# Patient Record
Sex: FEMALE | Race: WHITE | NOT HISPANIC OR LATINO
[De-identification: names, ages, dates, MRNs, and addresses within clinical notes are randomized per-mention and may not be internally consistent; named-entity substitution may affect disease eponyms.]

---

## 2021-11-03 PROBLEM — Z00.00 ENCOUNTER FOR PREVENTIVE HEALTH EXAMINATION: Status: ACTIVE | Noted: 2021-11-03

## 2021-12-26 ENCOUNTER — NON-APPOINTMENT (OUTPATIENT)
Age: 26
End: 2021-12-26

## 2021-12-28 ENCOUNTER — APPOINTMENT (OUTPATIENT)
Dept: OBGYN | Facility: CLINIC | Age: 26
End: 2021-12-28
Payer: COMMERCIAL

## 2021-12-28 ENCOUNTER — TRANSCRIPTION ENCOUNTER (OUTPATIENT)
Age: 26
End: 2021-12-28

## 2021-12-28 VITALS
HEIGHT: 63 IN | BODY MASS INDEX: 22.15 KG/M2 | WEIGHT: 125 LBS | HEART RATE: 75 BPM | RESPIRATION RATE: 14 BRPM | DIASTOLIC BLOOD PRESSURE: 60 MMHG | SYSTOLIC BLOOD PRESSURE: 112 MMHG

## 2021-12-28 DIAGNOSIS — Z01.419 ENCOUNTER FOR GYNECOLOGICAL EXAMINATION (GENERAL) (ROUTINE) W/OUT ABNORMAL FINDINGS: ICD-10-CM

## 2021-12-28 DIAGNOSIS — Z80.3 FAMILY HISTORY OF MALIGNANT NEOPLASM OF BREAST: ICD-10-CM

## 2021-12-28 PROCEDURE — 99395 PREV VISIT EST AGE 18-39: CPT

## 2021-12-28 NOTE — HISTORY OF PRESENT ILLNESS
[Regular Cycle Intervals] : periods have been regular [Menarche Age: ____] : age at menarche was [unfilled] [Currently Active] : currently active [Men] : men [Vaginal] : vaginal [No] : No [Condoms] : Condoms [TextBox_4] : This is the patient's first ever GYN visit. Pt not on any medications.\par , no plans for pregnancy yet\par LMP 21\par periods are regular, consistent, cycles always 28-30 days, duration 6 days\par gets premenstrual headaches but no cramps, no heavy bleeds, uses tampons & pads both\par Not on BC, always uses condoms and natural planning apps for tracking fertile days\par sexually active with one male partner only, for her entire life, who is her now \par Received gardasil [PapSmeardate] : never [FreeTextEntry1] : 12/21/21

## 2021-12-29 ENCOUNTER — APPOINTMENT (OUTPATIENT)
Dept: OBGYN | Facility: CLINIC | Age: 26
End: 2021-12-29

## 2022-08-22 ENCOUNTER — NON-APPOINTMENT (OUTPATIENT)
Age: 27
End: 2022-08-22

## 2022-08-30 ENCOUNTER — APPOINTMENT (OUTPATIENT)
Dept: OBGYN | Facility: CLINIC | Age: 27
End: 2022-08-30
Payer: COMMERCIAL

## 2022-08-30 VITALS
DIASTOLIC BLOOD PRESSURE: 60 MMHG | HEIGHT: 63 IN | BODY MASS INDEX: 22.15 KG/M2 | WEIGHT: 125 LBS | SYSTOLIC BLOOD PRESSURE: 120 MMHG

## 2022-08-30 PROCEDURE — 99213 OFFICE O/P EST LOW 20 MIN: CPT | Mod: 25

## 2022-08-30 PROCEDURE — 76817 TRANSVAGINAL US OBSTETRIC: CPT

## 2022-08-30 PROCEDURE — 99203 OFFICE O/P NEW LOW 30 MIN: CPT | Mod: 25

## 2022-08-30 NOTE — HISTORY OF PRESENT ILLNESS
[FreeTextEntry1] : 28yo  presents with positive pregnancy  test.\par LMP: 7/15/2022\par SCAR 2023\par GA today: 6w4d\par \par Office US: CRL 0.89cm measuring 6w6d, averaged over 3 measurements\par \par Plan:\par New OB visit in 3 weeks\par Reviewed PNV, diet, exercise, pregnancy journey, genetic testing.\par Patient and her  already completed universal screening.\par SALLY Bernstein MD\par

## 2022-09-21 ENCOUNTER — APPOINTMENT (OUTPATIENT)
Dept: OBGYN | Facility: CLINIC | Age: 27
End: 2022-09-21

## 2022-09-21 VITALS
DIASTOLIC BLOOD PRESSURE: 78 MMHG | BODY MASS INDEX: 25.34 KG/M2 | SYSTOLIC BLOOD PRESSURE: 118 MMHG | HEIGHT: 63 IN | WEIGHT: 143 LBS

## 2022-09-21 DIAGNOSIS — Z34.91 ENCOUNTER FOR SUPERVISION OF NORMAL PREGNANCY, UNSPECIFIED, FIRST TRIMESTER: ICD-10-CM

## 2022-09-21 PROCEDURE — 76817 TRANSVAGINAL US OBSTETRIC: CPT

## 2022-09-21 PROCEDURE — 36415 COLL VENOUS BLD VENIPUNCTURE: CPT

## 2022-09-21 PROCEDURE — 81015 MICROSCOPIC EXAM OF URINE: CPT

## 2022-09-21 PROCEDURE — 0501F PRENATAL FLOW SHEET: CPT

## 2022-09-22 LAB
25(OH)D3 SERPL-MCNC: 25.9 NG/ML
ABO + RH PNL BLD: NORMAL
BASOPHILS # BLD AUTO: 0.05 K/UL
BASOPHILS NFR BLD AUTO: 0.5 %
BLD GP AB SCN SERPL QL: NORMAL
EOSINOPHIL # BLD AUTO: 0.17 K/UL
EOSINOPHIL NFR BLD AUTO: 1.7 %
ESTIMATED AVERAGE GLUCOSE: 100 MG/DL
GLUCOSE 1H P 50 G GLC PO SERPL-MCNC: 124 MG/DL
HBA1C MFR BLD HPLC: 5.1 %
HCT VFR BLD CALC: 39.6 %
HGB A MFR BLD: 96.6 %
HGB A2 MFR BLD: 2.8 %
HGB BLD-MCNC: 12.8 G/DL
HGB F MFR BLD: 0.6 %
HGB FRACT BLD-IMP: NORMAL
HIV1+2 AB SPEC QL IA.RAPID: NONREACTIVE
IMM GRANULOCYTES NFR BLD AUTO: 0.4 %
LYMPHOCYTES # BLD AUTO: 1.23 K/UL
LYMPHOCYTES NFR BLD AUTO: 12.1 %
MAN DIFF?: NORMAL
MCHC RBC-ENTMCNC: 28.9 PG
MCHC RBC-ENTMCNC: 32.3 GM/DL
MCV RBC AUTO: 89.4 FL
MONOCYTES # BLD AUTO: 0.53 K/UL
MONOCYTES NFR BLD AUTO: 5.2 %
NEUTROPHILS # BLD AUTO: 8.15 K/UL
NEUTROPHILS NFR BLD AUTO: 80.1 %
PLATELET # BLD AUTO: 260 K/UL
RBC # BLD: 4.43 M/UL
RBC # FLD: 12.1 %
TSH SERPL-ACNC: 1.68 UIU/ML
WBC # FLD AUTO: 10.17 K/UL

## 2022-09-26 ENCOUNTER — NON-APPOINTMENT (OUTPATIENT)
Age: 27
End: 2022-09-26

## 2022-09-26 DIAGNOSIS — Z13.79 ENCOUNTER FOR OTHER SCREENING FOR GENETIC AND CHROMOSOMAL ANOMALIES: ICD-10-CM

## 2022-09-28 ENCOUNTER — APPOINTMENT (OUTPATIENT)
Dept: ANTEPARTUM | Facility: CLINIC | Age: 27
End: 2022-09-28

## 2022-10-06 LAB
B19V IGG SER QL IA: 5.83 INDEX
B19V IGG+IGM SER-IMP: NORMAL
B19V IGG+IGM SER-IMP: POSITIVE
B19V IGM FLD-ACNC: 0.09 INDEX
B19V IGM SER-ACNC: NEGATIVE
BACTERIA UR CULT: NORMAL
C TRACH RRNA SPEC QL NAA+PROBE: NOT DETECTED
HBV SURFACE AG SER QL: NONREACTIVE
HCV AB SER QL: NONREACTIVE
HCV S/CO RATIO: 0.08 S/CO
HSV 1+2 IGG SER IA-IMP: NEGATIVE
HSV 1+2 IGG SER IA-IMP: NEGATIVE
HSV1 IGG SER QL: 0.14 INDEX
HSV1 IGM SER QL: NEGATIVE
HSV2 AB FLD-ACNC: NEGATIVE
HSV2 IGG SER QL: 0.05 INDEX
MEV IGG FLD QL IA: >300 AU/ML
MEV IGG+IGM SER-IMP: POSITIVE
N GONORRHOEA RRNA SPEC QL NAA+PROBE: NOT DETECTED
RUBV IGG FLD-ACNC: 9.4 INDEX
RUBV IGG SER-IMP: POSITIVE
SOURCE AMPLIFICATION: NORMAL
T PALLIDUM AB SER QL IA: NEGATIVE
VZV AB TITR SER: POSITIVE
VZV IGG SER IF-ACNC: 174.5 INDEX

## 2022-10-10 ENCOUNTER — APPOINTMENT (OUTPATIENT)
Dept: ANTEPARTUM | Facility: CLINIC | Age: 27
End: 2022-10-10

## 2022-10-10 ENCOUNTER — ASOB RESULT (OUTPATIENT)
Age: 27
End: 2022-10-10

## 2022-10-10 PROCEDURE — 76813 OB US NUCHAL MEAS 1 GEST: CPT

## 2022-10-10 PROCEDURE — 36415 COLL VENOUS BLD VENIPUNCTURE: CPT

## 2022-10-13 LAB
ADDITIONAL US: NORMAL
CRL SCAN TWIN B: NORMAL
CRL SCAN: NORMAL
CROWN RUMP LENGTH TWIN B: NORMAL
CROWN RUMP LENGTH: 62.7 MM
DIA MOM: 0.78
DIA VALUE: 185.5 PG/ML
DOWN SYNDROME AGE RISK: NORMAL
DOWN SYNDROME INTERPRETATION: NORMAL
DOWN SYNDROME SCREENING RISK: NORMAL
FIRST TRIMESTER SCREEN COMMENTS: NORMAL
FIRST TRIMESTER SCREEN NOTE: NORMAL
FIRST TRIMESTER SCREEN RESULTS: NORMAL
FIRST TRIMESTER SCREEN TEST RESULTS: NORMAL
GEST. AGE ON COLLECTION DATE: 12.4 WEEKS
HCG MOM: 0.68
HCG VALUE: 69.4 IU/ML
MATERNAL AGE AT EDD: 29.8 YR
NT MOM TWIN B: NORMAL
NT TWIN B: NORMAL
NUCHAL TRANSLUCENCY (NT): 1.8 MM
NUCHAL TRANSLUCENCY MOM: 1.35
NUMBER OF FETUSES: 1
RACE: NORMAL
SONOGRAPHER ID#: NORMAL
TRISOMY 18 AGE RISK: NORMAL
TRISOMY 18 INTERPRETATION: NORMAL
TRISOMY 18 SCREENING RISK: NORMAL
WEIGHT AFP: 143 LBS

## 2022-10-20 ENCOUNTER — NON-APPOINTMENT (OUTPATIENT)
Age: 27
End: 2022-10-20

## 2022-10-20 ENCOUNTER — APPOINTMENT (OUTPATIENT)
Dept: OBGYN | Facility: CLINIC | Age: 27
End: 2022-10-20

## 2022-10-20 PROCEDURE — 81003 URINALYSIS AUTO W/O SCOPE: CPT | Mod: QW

## 2022-10-20 PROCEDURE — 36415 COLL VENOUS BLD VENIPUNCTURE: CPT

## 2022-10-20 PROCEDURE — 0502F SUBSEQUENT PRENATAL CARE: CPT

## 2022-10-24 LAB
BILIRUB UR QL STRIP: NORMAL
GLUCOSE UR-MCNC: NORMAL
HCG UR QL: 0.2 EU/DL
HGB UR QL STRIP.AUTO: NORMAL
KETONES UR-MCNC: NORMAL
LEUKOCYTE ESTERASE UR QL STRIP: NORMAL
NITRITE UR QL STRIP: NORMAL
PH UR STRIP: 5.5
PROT UR STRIP-MCNC: NORMAL
SP GR UR STRIP: 1

## 2022-11-01 ENCOUNTER — NON-APPOINTMENT (OUTPATIENT)
Age: 27
End: 2022-11-01

## 2022-11-03 LAB
CLARI ADDITIONAL INFO: NORMAL
CLARI CHROMOSOME 13: NORMAL
CLARI CHROMOSOME 18: NORMAL
CLARI CHROMOSOME 21: NORMAL
CLARI SEX CHROMOSOMES: NORMAL
CLARI TEST COMMENT: NORMAL
CLARITEST NIPT: NORMAL
FETAL FRACT: NORMAL
GESTATION AGE: NORMAL
MATERNAL WEIGHT (LBS):: NORMAL
PLEASE INCLUDE GENDER RESULTS ON THIS REPORT:: NORMAL
TYPE OF PREGNANCY:: NORMAL

## 2022-11-08 ENCOUNTER — NON-APPOINTMENT (OUTPATIENT)
Age: 27
End: 2022-11-08

## 2022-11-14 ENCOUNTER — APPOINTMENT (OUTPATIENT)
Dept: ANTEPARTUM | Facility: CLINIC | Age: 27
End: 2022-11-14

## 2022-11-14 ENCOUNTER — NON-APPOINTMENT (OUTPATIENT)
Age: 27
End: 2022-11-14

## 2022-11-14 PROCEDURE — 36415 COLL VENOUS BLD VENIPUNCTURE: CPT

## 2022-11-15 ENCOUNTER — APPOINTMENT (OUTPATIENT)
Dept: OBGYN | Facility: CLINIC | Age: 27
End: 2022-11-15

## 2022-11-15 VITALS
SYSTOLIC BLOOD PRESSURE: 120 MMHG | HEART RATE: 100 BPM | WEIGHT: 146 LBS | TEMPERATURE: 98 F | BODY MASS INDEX: 25.86 KG/M2 | DIASTOLIC BLOOD PRESSURE: 68 MMHG

## 2022-11-15 LAB
BILIRUB UR QL STRIP: NORMAL
GLUCOSE UR-MCNC: NORMAL
HCG UR QL: 0.2 EU/DL
HGB UR QL STRIP.AUTO: NORMAL
KETONES UR-MCNC: NORMAL
LEUKOCYTE ESTERASE UR QL STRIP: NORMAL
NITRITE UR QL STRIP: NORMAL
PH UR STRIP: 6
PROT UR STRIP-MCNC: NORMAL
SP GR UR STRIP: 1.01

## 2022-11-15 PROCEDURE — 0502F SUBSEQUENT PRENATAL CARE: CPT

## 2022-11-17 LAB
ADDITIONAL US: NORMAL
AFP MOM: 0.61
AFP VALUE: 24.5 NG/ML
COLLECTED ON 2: NORMAL
COLLECTED ON: NORMAL
CRL SCAN TWIN B: NORMAL
CRL SCAN: NORMAL
CROWN RUMP LENGTH TWIN B: NORMAL
CROWN RUMP LENGTH: 62.7 MM
DIA MOM: 0.78
DIA VALUE: 122.1 PG/ML
DOWN SYNDROME AGE RISK: NORMAL
DOWN SYNDROME INTERPRETATION: NORMAL
DOWN SYNDROME SCREENING RISK: NORMAL
FIRST TRIMESTER SAMPLE: NORMAL
GEST. AGE ON COLLECTION DATE: 12.4 WEEKS
GESTATIONAL AGE: 17.4 WEEKS
HCG MOM: 0.89
HCG VALUE: 26.6 IU/ML
INSULIN DEP DIABETES: NO
MATERNAL AGE AT EDD: 29.8 YR
NT MOM TWIN B: NORMAL
NT TWIN B: NORMAL
NUCHAL TRANSLUCENCY (NT): 1.8 MM
NUCHAL TRANSLUCENCY MOM: 1.35
NUMBER OF FETUSES: 1
OPEN SPINA BIFIDA: NORMAL
OSB INTERPRETATION: NORMAL
PAPP-A MOM: 1.83
PAPP-A VALUE: 1818.3 NG/ML
RACE: NORMAL
SECOND TRIMESTER SAMPLE: NORMAL
SEQUENTIAL 2 COMMENTS: NORMAL
SEQUENTIAL 2 NOTE: NORMAL
SEQUENTIAL 2 RESULTS: NORMAL
SEQUENTIAL 2 TEST RESULTS: NORMAL
SONOGRAPHER ID#: NORMAL
TRISOMY 18 AGE RISK: NORMAL
TRISOMY 18 INTERPRETATION: NORMAL
TRISOMY 18 SCREENING RISK: NORMAL
UE3 MOM: 1.01
UE3 VALUE: 1.31 NG/ML
WEIGHT AFP: 143 LBS
WEIGHT: 147 LBS

## 2022-12-06 ENCOUNTER — ASOB RESULT (OUTPATIENT)
Age: 27
End: 2022-12-06

## 2022-12-06 ENCOUNTER — APPOINTMENT (OUTPATIENT)
Dept: ANTEPARTUM | Facility: CLINIC | Age: 27
End: 2022-12-06

## 2022-12-06 PROCEDURE — 76805 OB US >/= 14 WKS SNGL FETUS: CPT

## 2022-12-06 PROCEDURE — 76817 TRANSVAGINAL US OBSTETRIC: CPT | Mod: 59

## 2022-12-14 ENCOUNTER — NON-APPOINTMENT (OUTPATIENT)
Age: 27
End: 2022-12-14

## 2022-12-15 ENCOUNTER — APPOINTMENT (OUTPATIENT)
Dept: OBGYN | Facility: CLINIC | Age: 27
End: 2022-12-15

## 2022-12-15 VITALS
BODY MASS INDEX: 26.22 KG/M2 | WEIGHT: 148 LBS | SYSTOLIC BLOOD PRESSURE: 114 MMHG | HEIGHT: 63 IN | DIASTOLIC BLOOD PRESSURE: 70 MMHG | RESPIRATION RATE: 16 BRPM

## 2022-12-15 PROCEDURE — 81003 URINALYSIS AUTO W/O SCOPE: CPT | Mod: QW

## 2022-12-15 PROCEDURE — 0502F SUBSEQUENT PRENATAL CARE: CPT

## 2022-12-30 ENCOUNTER — APPOINTMENT (OUTPATIENT)
Dept: OBGYN | Facility: CLINIC | Age: 27
End: 2022-12-30

## 2023-01-09 DIAGNOSIS — Z34.92 ENCOUNTER FOR SUPERVISION OF NORMAL PREGNANCY, UNSPECIFIED, SECOND TRIMESTER: ICD-10-CM

## 2023-01-10 ENCOUNTER — OUTPATIENT (OUTPATIENT)
Dept: INPATIENT UNIT | Facility: HOSPITAL | Age: 28
LOS: 1 days | Discharge: HOME | End: 2023-01-10
Payer: COMMERCIAL

## 2023-01-10 ENCOUNTER — APPOINTMENT (OUTPATIENT)
Dept: OBGYN | Facility: CLINIC | Age: 28
End: 2023-01-10
Payer: COMMERCIAL

## 2023-01-10 VITALS
HEART RATE: 153 BPM | TEMPERATURE: 97.1 F | RESPIRATION RATE: 14 BRPM | DIASTOLIC BLOOD PRESSURE: 90 MMHG | BODY MASS INDEX: 27.11 KG/M2 | OXYGEN SATURATION: 98 % | WEIGHT: 153 LBS | SYSTOLIC BLOOD PRESSURE: 135 MMHG | HEIGHT: 63 IN

## 2023-01-10 DIAGNOSIS — Z3A.00 WEEKS OF GESTATION OF PREGNANCY NOT SPECIFIED: ICD-10-CM

## 2023-01-10 DIAGNOSIS — O26.899 OTHER SPECIFIED PREGNANCY RELATED CONDITIONS, UNSPECIFIED TRIMESTER: ICD-10-CM

## 2023-01-10 LAB
ALBUMIN SERPL ELPH-MCNC: 2.9 G/DL — LOW (ref 3.3–5)
ALP SERPL-CCNC: 59 U/L — SIGNIFICANT CHANGE UP (ref 40–120)
ALT FLD-CCNC: 44 U/L — SIGNIFICANT CHANGE UP (ref 12–78)
ANION GAP SERPL CALC-SCNC: 6 MMOL/L — SIGNIFICANT CHANGE UP (ref 5–17)
APPEARANCE UR: CLEAR — SIGNIFICANT CHANGE UP
APTT BLD: 27.8 SEC — SIGNIFICANT CHANGE UP (ref 27.5–35.5)
AST SERPL-CCNC: 29 U/L — SIGNIFICANT CHANGE UP (ref 15–37)
BASOPHILS # BLD AUTO: 0 K/UL — SIGNIFICANT CHANGE UP (ref 0–0.2)
BASOPHILS NFR BLD AUTO: 0 % — SIGNIFICANT CHANGE UP (ref 0–2)
BILIRUB SERPL-MCNC: 0.3 MG/DL — SIGNIFICANT CHANGE UP (ref 0.2–1.2)
BILIRUB UR-MCNC: NEGATIVE — SIGNIFICANT CHANGE UP
BUN SERPL-MCNC: 7 MG/DL — SIGNIFICANT CHANGE UP (ref 7–23)
CALCIUM SERPL-MCNC: 8.9 MG/DL — SIGNIFICANT CHANGE UP (ref 8.5–10.1)
CHLORIDE SERPL-SCNC: 109 MMOL/L — HIGH (ref 96–108)
CO2 SERPL-SCNC: 24 MMOL/L — SIGNIFICANT CHANGE UP (ref 22–31)
COLOR SPEC: YELLOW — SIGNIFICANT CHANGE UP
CREAT ?TM UR-MCNC: 27 MG/DL — SIGNIFICANT CHANGE UP
CREAT SERPL-MCNC: 0.41 MG/DL — LOW (ref 0.5–1.3)
DIFF PNL FLD: NEGATIVE — SIGNIFICANT CHANGE UP
EGFR: 136 ML/MIN/1.73M2 — SIGNIFICANT CHANGE UP
EOSINOPHIL # BLD AUTO: 0.24 K/UL — SIGNIFICANT CHANGE UP (ref 0–0.5)
EOSINOPHIL NFR BLD AUTO: 2 % — SIGNIFICANT CHANGE UP (ref 0–6)
FIBRINOGEN PPP-MCNC: 656 MG/DL — HIGH (ref 330–520)
GLUCOSE SERPL-MCNC: 97 MG/DL — SIGNIFICANT CHANGE UP (ref 70–99)
GLUCOSE UR QL: NEGATIVE — SIGNIFICANT CHANGE UP
HCT VFR BLD CALC: 35.6 % — SIGNIFICANT CHANGE UP (ref 34.5–45)
HGB BLD-MCNC: 12.3 G/DL — SIGNIFICANT CHANGE UP (ref 11.5–15.5)
INR BLD: 1.02 RATIO — SIGNIFICANT CHANGE UP (ref 0.88–1.16)
KETONES UR-MCNC: NEGATIVE — SIGNIFICANT CHANGE UP
LDH SERPL L TO P-CCNC: 174 U/L — SIGNIFICANT CHANGE UP (ref 84–241)
LEUKOCYTE ESTERASE UR-ACNC: ABNORMAL
LYMPHOCYTES # BLD AUTO: 1.32 K/UL — SIGNIFICANT CHANGE UP (ref 1–3.3)
LYMPHOCYTES # BLD AUTO: 11 % — LOW (ref 13–44)
MCHC RBC-ENTMCNC: 29.9 PG — SIGNIFICANT CHANGE UP (ref 27–34)
MCHC RBC-ENTMCNC: 34.6 GM/DL — SIGNIFICANT CHANGE UP (ref 32–36)
MCV RBC AUTO: 86.6 FL — SIGNIFICANT CHANGE UP (ref 80–100)
MONOCYTES # BLD AUTO: 0.48 K/UL — SIGNIFICANT CHANGE UP (ref 0–0.9)
MONOCYTES NFR BLD AUTO: 4 % — SIGNIFICANT CHANGE UP (ref 2–14)
NEUTROPHILS # BLD AUTO: 9.49 K/UL — HIGH (ref 1.8–7.4)
NEUTROPHILS NFR BLD AUTO: 79 % — HIGH (ref 43–77)
NITRITE UR-MCNC: NEGATIVE — SIGNIFICANT CHANGE UP
NRBC # BLD: SIGNIFICANT CHANGE UP /100 WBCS (ref 0–0)
PH UR: 7 — SIGNIFICANT CHANGE UP (ref 5–8)
PLATELET # BLD AUTO: 167 K/UL — SIGNIFICANT CHANGE UP (ref 150–400)
POTASSIUM SERPL-MCNC: 3.6 MMOL/L — SIGNIFICANT CHANGE UP (ref 3.5–5.3)
POTASSIUM SERPL-SCNC: 3.6 MMOL/L — SIGNIFICANT CHANGE UP (ref 3.5–5.3)
PROT ?TM UR-MCNC: <5 MG/DL — SIGNIFICANT CHANGE UP (ref 0–12)
PROT SERPL-MCNC: 7.2 GM/DL — SIGNIFICANT CHANGE UP (ref 6–8.3)
PROT UR-MCNC: NEGATIVE — SIGNIFICANT CHANGE UP
PROT/CREAT UR-RTO: <0.2 RATIO — SIGNIFICANT CHANGE UP (ref 0–0.2)
PROTHROM AB SERPL-ACNC: 11.8 SEC — SIGNIFICANT CHANGE UP (ref 10.5–13.4)
RBC # BLD: 4.11 M/UL — SIGNIFICANT CHANGE UP (ref 3.8–5.2)
RBC # FLD: 12.4 % — SIGNIFICANT CHANGE UP (ref 10.3–14.5)
SODIUM SERPL-SCNC: 139 MMOL/L — SIGNIFICANT CHANGE UP (ref 135–145)
SP GR SPEC: 1.01 — SIGNIFICANT CHANGE UP (ref 1.01–1.02)
URATE SERPL-MCNC: 2.7 MG/DL — SIGNIFICANT CHANGE UP (ref 2.5–7)
UROBILINOGEN FLD QL: NEGATIVE — SIGNIFICANT CHANGE UP
WBC # BLD: 12.01 K/UL — HIGH (ref 3.8–10.5)
WBC # FLD AUTO: 12.01 K/UL — HIGH (ref 3.8–10.5)

## 2023-01-10 PROCEDURE — 36415 COLL VENOUS BLD VENIPUNCTURE: CPT

## 2023-01-10 PROCEDURE — 0502F SUBSEQUENT PRENATAL CARE: CPT

## 2023-01-10 PROCEDURE — 80053 COMPREHEN METABOLIC PANEL: CPT

## 2023-01-10 PROCEDURE — 83615 LACTATE (LD) (LDH) ENZYME: CPT

## 2023-01-10 PROCEDURE — 85025 COMPLETE CBC W/AUTO DIFF WBC: CPT

## 2023-01-10 PROCEDURE — 84156 ASSAY OF PROTEIN URINE: CPT

## 2023-01-10 PROCEDURE — 81003 URINALYSIS AUTO W/O SCOPE: CPT | Mod: QW

## 2023-01-10 PROCEDURE — 85384 FIBRINOGEN ACTIVITY: CPT

## 2023-01-10 PROCEDURE — 85730 THROMBOPLASTIN TIME PARTIAL: CPT

## 2023-01-10 PROCEDURE — 81001 URINALYSIS AUTO W/SCOPE: CPT

## 2023-01-10 PROCEDURE — 82570 ASSAY OF URINE CREATININE: CPT

## 2023-01-10 PROCEDURE — 99214 OFFICE O/P EST MOD 30 MIN: CPT

## 2023-01-10 PROCEDURE — 76816 OB US FOLLOW-UP PER FETUS: CPT

## 2023-01-10 PROCEDURE — 59025 FETAL NON-STRESS TEST: CPT

## 2023-01-10 PROCEDURE — 84550 ASSAY OF BLOOD/URIC ACID: CPT

## 2023-01-10 PROCEDURE — 85610 PROTHROMBIN TIME: CPT

## 2023-01-11 LAB — PROT ?TM UR-MCNC: 5 MG/DL — SIGNIFICANT CHANGE UP (ref 0–12)

## 2023-01-12 ENCOUNTER — NON-APPOINTMENT (OUTPATIENT)
Age: 28
End: 2023-01-12

## 2023-01-12 DIAGNOSIS — Z3A.00 WEEKS OF GESTATION OF PREGNANCY NOT SPECIFIED: ICD-10-CM

## 2023-01-12 DIAGNOSIS — O26.899 OTHER SPECIFIED PREGNANCY RELATED CONDITIONS, UNSPECIFIED TRIMESTER: ICD-10-CM

## 2023-01-17 LAB
ANION GAP SERPL CALC-SCNC: 12 MMOL/L
BASOPHILS # BLD AUTO: 0.06 K/UL
BASOPHILS NFR BLD AUTO: 0.5 %
BUN SERPL-MCNC: 9 MG/DL
CALCIUM SERPL-MCNC: 9.5 MG/DL
CHLORIDE SERPL-SCNC: 102 MMOL/L
CO2 SERPL-SCNC: 24 MMOL/L
CREAT SERPL-MCNC: 0.5 MG/DL
EGFR: 130 ML/MIN/1.73M2
EOSINOPHIL # BLD AUTO: 0.17 K/UL
EOSINOPHIL NFR BLD AUTO: 1.4 %
GLUCOSE SERPL-MCNC: 102 MG/DL
HCT VFR BLD CALC: 38.3 %
HGB BLD-MCNC: 12.7 G/DL
IMM GRANULOCYTES NFR BLD AUTO: 2.5 %
LYMPHOCYTES # BLD AUTO: 1.41 K/UL
LYMPHOCYTES NFR BLD AUTO: 11.6 %
MAN DIFF?: NORMAL
MCHC RBC-ENTMCNC: 29.3 PG
MCHC RBC-ENTMCNC: 33.2 GM/DL
MCV RBC AUTO: 88.5 FL
MONOCYTES # BLD AUTO: 0.71 K/UL
MONOCYTES NFR BLD AUTO: 5.8 %
NEUTROPHILS # BLD AUTO: 9.51 K/UL
NEUTROPHILS NFR BLD AUTO: 78.2 %
PLATELET # BLD AUTO: 199 K/UL
POTASSIUM SERPL-SCNC: 3.7 MMOL/L
RBC # BLD: 4.33 M/UL
RBC # FLD: 13 %
SODIUM SERPL-SCNC: 138 MMOL/L
WBC # FLD AUTO: 12.16 K/UL

## 2023-01-18 ENCOUNTER — NON-APPOINTMENT (OUTPATIENT)
Age: 28
End: 2023-01-18

## 2023-01-19 ENCOUNTER — NON-APPOINTMENT (OUTPATIENT)
Age: 28
End: 2023-01-19

## 2023-01-20 ENCOUNTER — APPOINTMENT (OUTPATIENT)
Dept: OBGYN | Facility: CLINIC | Age: 28
End: 2023-01-20
Payer: COMMERCIAL

## 2023-01-20 VITALS
SYSTOLIC BLOOD PRESSURE: 130 MMHG | DIASTOLIC BLOOD PRESSURE: 70 MMHG | WEIGHT: 150 LBS | BODY MASS INDEX: 26.58 KG/M2 | HEIGHT: 63 IN

## 2023-01-20 DIAGNOSIS — Z3A.27 27 WEEKS GESTATION OF PREGNANCY: ICD-10-CM

## 2023-01-20 LAB
BILIRUB UR QL STRIP: NORMAL
CLARITY UR: NORMAL
COLLECTION METHOD: NORMAL
GLUCOSE UR-MCNC: NORMAL
HCG UR QL: 0.2 EU/DL
HGB UR QL STRIP.AUTO: NORMAL
KETONES UR-MCNC: NORMAL
LEUKOCYTE ESTERASE UR QL STRIP: NORMAL
NITRITE UR QL STRIP: NORMAL
PH UR STRIP: 7
PROT UR STRIP-MCNC: NORMAL
SP GR UR STRIP: 1.02

## 2023-01-20 PROCEDURE — 0502F SUBSEQUENT PRENATAL CARE: CPT

## 2023-01-20 PROCEDURE — 81003 URINALYSIS AUTO W/O SCOPE: CPT | Mod: QW

## 2023-01-26 LAB — GLUCOSE 1H P 50 G GLC PO SERPL-MCNC: 90 MG/DL

## 2023-01-30 ENCOUNTER — NON-APPOINTMENT (OUTPATIENT)
Age: 28
End: 2023-01-30

## 2023-01-31 ENCOUNTER — ASOB RESULT (OUTPATIENT)
Age: 28
End: 2023-01-31

## 2023-01-31 ENCOUNTER — APPOINTMENT (OUTPATIENT)
Dept: ANTEPARTUM | Facility: CLINIC | Age: 28
End: 2023-01-31
Payer: COMMERCIAL

## 2023-01-31 PROCEDURE — 76817 TRANSVAGINAL US OBSTETRIC: CPT

## 2023-01-31 PROCEDURE — 76816 OB US FOLLOW-UP PER FETUS: CPT

## 2023-02-08 ENCOUNTER — NON-APPOINTMENT (OUTPATIENT)
Age: 28
End: 2023-02-08

## 2023-02-09 ENCOUNTER — APPOINTMENT (OUTPATIENT)
Dept: OBGYN | Facility: CLINIC | Age: 28
End: 2023-02-09
Payer: COMMERCIAL

## 2023-02-09 VITALS
HEIGHT: 63 IN | SYSTOLIC BLOOD PRESSURE: 126 MMHG | WEIGHT: 157 LBS | DIASTOLIC BLOOD PRESSURE: 82 MMHG | BODY MASS INDEX: 27.82 KG/M2

## 2023-02-09 DIAGNOSIS — Z3A.29 29 WEEKS GESTATION OF PREGNANCY: ICD-10-CM

## 2023-02-09 LAB
BILIRUB UR QL STRIP: NORMAL
CLARITY UR: CLEAR
COLLECTION METHOD: NORMAL
GLUCOSE UR-MCNC: NORMAL
HCG UR QL: 0.2 EU/DL
HGB UR QL STRIP.AUTO: NORMAL
KETONES UR-MCNC: NORMAL
LEUKOCYTE ESTERASE UR QL STRIP: NORMAL
NITRITE UR QL STRIP: NORMAL
PH UR STRIP: 6.5
PROT UR STRIP-MCNC: NORMAL
SP GR UR STRIP: 1.01

## 2023-02-09 PROCEDURE — 0502F SUBSEQUENT PRENATAL CARE: CPT

## 2023-02-14 ENCOUNTER — NON-APPOINTMENT (OUTPATIENT)
Age: 28
End: 2023-02-14

## 2023-02-14 ENCOUNTER — OUTPATIENT (OUTPATIENT)
Dept: INPATIENT UNIT | Facility: HOSPITAL | Age: 28
LOS: 1 days | Discharge: HOME | End: 2023-02-14
Payer: COMMERCIAL

## 2023-02-14 DIAGNOSIS — O26.899 OTHER SPECIFIED PREGNANCY RELATED CONDITIONS, UNSPECIFIED TRIMESTER: ICD-10-CM

## 2023-02-14 LAB
ALBUMIN SERPL ELPH-MCNC: 2.8 G/DL — LOW (ref 3.3–5)
ALP SERPL-CCNC: 96 U/L — SIGNIFICANT CHANGE UP (ref 40–120)
ALT FLD-CCNC: 36 U/L — SIGNIFICANT CHANGE UP (ref 12–78)
ANION GAP SERPL CALC-SCNC: 6 MMOL/L — SIGNIFICANT CHANGE UP (ref 5–17)
APPEARANCE UR: CLEAR — SIGNIFICANT CHANGE UP
APTT BLD: 26.8 SEC — LOW (ref 27.5–35.5)
AST SERPL-CCNC: 24 U/L — SIGNIFICANT CHANGE UP (ref 15–37)
BASOPHILS # BLD AUTO: 0.07 K/UL — SIGNIFICANT CHANGE UP (ref 0–0.2)
BASOPHILS NFR BLD AUTO: 0.5 % — SIGNIFICANT CHANGE UP (ref 0–2)
BILIRUB SERPL-MCNC: 0.3 MG/DL — SIGNIFICANT CHANGE UP (ref 0.2–1.2)
BILIRUB UR-MCNC: NEGATIVE — SIGNIFICANT CHANGE UP
BUN SERPL-MCNC: 8 MG/DL — SIGNIFICANT CHANGE UP (ref 7–23)
CALCIUM SERPL-MCNC: 8.9 MG/DL — SIGNIFICANT CHANGE UP (ref 8.5–10.1)
CHLORIDE SERPL-SCNC: 108 MMOL/L — SIGNIFICANT CHANGE UP (ref 96–108)
CO2 SERPL-SCNC: 23 MMOL/L — SIGNIFICANT CHANGE UP (ref 22–31)
COLOR SPEC: YELLOW — SIGNIFICANT CHANGE UP
CREAT ?TM UR-MCNC: 86 MG/DL — SIGNIFICANT CHANGE UP
CREAT SERPL-MCNC: 0.51 MG/DL — SIGNIFICANT CHANGE UP (ref 0.5–1.3)
DIFF PNL FLD: ABNORMAL
EGFR: 130 ML/MIN/1.73M2 — SIGNIFICANT CHANGE UP
EOSINOPHIL # BLD AUTO: 0.08 K/UL — SIGNIFICANT CHANGE UP (ref 0–0.5)
EOSINOPHIL NFR BLD AUTO: 0.6 % — SIGNIFICANT CHANGE UP (ref 0–6)
FIBRINOGEN PPP-MCNC: 751 MG/DL — HIGH (ref 330–520)
GLUCOSE SERPL-MCNC: 105 MG/DL — HIGH (ref 70–99)
GLUCOSE UR QL: NEGATIVE — SIGNIFICANT CHANGE UP
HCT VFR BLD CALC: 36.8 % — SIGNIFICANT CHANGE UP (ref 34.5–45)
HGB BLD-MCNC: 12.4 G/DL — SIGNIFICANT CHANGE UP (ref 11.5–15.5)
IMM GRANULOCYTES NFR BLD AUTO: 3.5 % — HIGH (ref 0–0.9)
INR BLD: 1.01 RATIO — SIGNIFICANT CHANGE UP (ref 0.88–1.16)
KETONES UR-MCNC: ABNORMAL
LDH SERPL L TO P-CCNC: 199 U/L — SIGNIFICANT CHANGE UP (ref 84–241)
LEUKOCYTE ESTERASE UR-ACNC: ABNORMAL
LYMPHOCYTES # BLD AUTO: 1.57 K/UL — SIGNIFICANT CHANGE UP (ref 1–3.3)
LYMPHOCYTES # BLD AUTO: 10.9 % — LOW (ref 13–44)
MCHC RBC-ENTMCNC: 29.2 PG — SIGNIFICANT CHANGE UP (ref 27–34)
MCHC RBC-ENTMCNC: 33.7 GM/DL — SIGNIFICANT CHANGE UP (ref 32–36)
MCV RBC AUTO: 86.6 FL — SIGNIFICANT CHANGE UP (ref 80–100)
MONOCYTES # BLD AUTO: 0.95 K/UL — HIGH (ref 0–0.9)
MONOCYTES NFR BLD AUTO: 6.6 % — SIGNIFICANT CHANGE UP (ref 2–14)
NEUTROPHILS # BLD AUTO: 11.18 K/UL — HIGH (ref 1.8–7.4)
NEUTROPHILS NFR BLD AUTO: 77.9 % — HIGH (ref 43–77)
NITRITE UR-MCNC: NEGATIVE — SIGNIFICANT CHANGE UP
PH UR: 7 — SIGNIFICANT CHANGE UP (ref 5–8)
PLATELET # BLD AUTO: 224 K/UL — SIGNIFICANT CHANGE UP (ref 150–400)
POTASSIUM SERPL-MCNC: 3.7 MMOL/L — SIGNIFICANT CHANGE UP (ref 3.5–5.3)
POTASSIUM SERPL-SCNC: 3.7 MMOL/L — SIGNIFICANT CHANGE UP (ref 3.5–5.3)
PROT ?TM UR-MCNC: 14 MG/DL — HIGH (ref 0–12)
PROT SERPL-MCNC: 7.4 GM/DL — SIGNIFICANT CHANGE UP (ref 6–8.3)
PROT UR-MCNC: NEGATIVE — SIGNIFICANT CHANGE UP
PROT/CREAT UR-RTO: 0.2 RATIO — SIGNIFICANT CHANGE UP (ref 0–0.2)
PROTHROM AB SERPL-ACNC: 11.7 SEC — SIGNIFICANT CHANGE UP (ref 10.5–13.4)
RBC # BLD: 4.25 M/UL — SIGNIFICANT CHANGE UP (ref 3.8–5.2)
RBC # FLD: 12.4 % — SIGNIFICANT CHANGE UP (ref 10.3–14.5)
SODIUM SERPL-SCNC: 137 MMOL/L — SIGNIFICANT CHANGE UP (ref 135–145)
SP GR SPEC: 1.01 — SIGNIFICANT CHANGE UP (ref 1.01–1.02)
URATE SERPL-MCNC: 2.9 MG/DL — SIGNIFICANT CHANGE UP (ref 2.5–7)
UROBILINOGEN FLD QL: NEGATIVE — SIGNIFICANT CHANGE UP
WBC # BLD: 14.35 K/UL — HIGH (ref 3.8–10.5)
WBC # FLD AUTO: 14.35 K/UL — HIGH (ref 3.8–10.5)

## 2023-02-14 PROCEDURE — 36415 COLL VENOUS BLD VENIPUNCTURE: CPT

## 2023-02-14 PROCEDURE — 80053 COMPREHEN METABOLIC PANEL: CPT

## 2023-02-14 PROCEDURE — 85730 THROMBOPLASTIN TIME PARTIAL: CPT

## 2023-02-14 PROCEDURE — 83615 LACTATE (LD) (LDH) ENZYME: CPT

## 2023-02-14 PROCEDURE — 93010 ELECTROCARDIOGRAM REPORT: CPT

## 2023-02-14 PROCEDURE — 85384 FIBRINOGEN ACTIVITY: CPT

## 2023-02-14 PROCEDURE — 82570 ASSAY OF URINE CREATININE: CPT

## 2023-02-14 PROCEDURE — 93005 ELECTROCARDIOGRAM TRACING: CPT

## 2023-02-14 PROCEDURE — 59025 FETAL NON-STRESS TEST: CPT

## 2023-02-14 PROCEDURE — 84156 ASSAY OF PROTEIN URINE: CPT

## 2023-02-14 PROCEDURE — 85610 PROTHROMBIN TIME: CPT

## 2023-02-14 PROCEDURE — 85025 COMPLETE CBC W/AUTO DIFF WBC: CPT

## 2023-02-14 PROCEDURE — 81001 URINALYSIS AUTO W/SCOPE: CPT

## 2023-02-14 PROCEDURE — 99214 OFFICE O/P EST MOD 30 MIN: CPT

## 2023-02-14 PROCEDURE — 84550 ASSAY OF BLOOD/URIC ACID: CPT

## 2023-02-16 DIAGNOSIS — Z3A.00 WEEKS OF GESTATION OF PREGNANCY NOT SPECIFIED: ICD-10-CM

## 2023-02-16 DIAGNOSIS — O26.899 OTHER SPECIFIED PREGNANCY RELATED CONDITIONS, UNSPECIFIED TRIMESTER: ICD-10-CM

## 2023-02-20 ENCOUNTER — NON-APPOINTMENT (OUTPATIENT)
Age: 28
End: 2023-02-20

## 2023-02-21 ENCOUNTER — APPOINTMENT (OUTPATIENT)
Dept: OBGYN | Facility: CLINIC | Age: 28
End: 2023-02-21
Payer: COMMERCIAL

## 2023-02-21 VITALS
SYSTOLIC BLOOD PRESSURE: 120 MMHG | TEMPERATURE: 97.1 F | DIASTOLIC BLOOD PRESSURE: 79 MMHG | WEIGHT: 154 LBS | HEIGHT: 63 IN | BODY MASS INDEX: 27.29 KG/M2

## 2023-02-21 DIAGNOSIS — Z3A.31 31 WEEKS GESTATION OF PREGNANCY: ICD-10-CM

## 2023-02-21 PROCEDURE — 0502F SUBSEQUENT PRENATAL CARE: CPT

## 2023-03-08 ENCOUNTER — NON-APPOINTMENT (OUTPATIENT)
Age: 28
End: 2023-03-08

## 2023-03-09 ENCOUNTER — APPOINTMENT (OUTPATIENT)
Dept: OBGYN | Facility: CLINIC | Age: 28
End: 2023-03-09
Payer: COMMERCIAL

## 2023-03-09 VITALS
HEIGHT: 63 IN | SYSTOLIC BLOOD PRESSURE: 160 MMHG | DIASTOLIC BLOOD PRESSURE: 82 MMHG | BODY MASS INDEX: 28.35 KG/M2 | WEIGHT: 160 LBS

## 2023-03-09 DIAGNOSIS — Z3A.33 33 WEEKS GESTATION OF PREGNANCY: ICD-10-CM

## 2023-03-09 LAB
BILIRUB UR QL STRIP: NORMAL
CLARITY UR: NORMAL
COLLECTION METHOD: NORMAL
GLUCOSE UR-MCNC: NORMAL
HCG UR QL: 0.2 EU/DL
HGB UR QL STRIP.AUTO: NORMAL
KETONES UR-MCNC: NORMAL
LEUKOCYTE ESTERASE UR QL STRIP: NORMAL
NITRITE UR QL STRIP: NORMAL
PH UR STRIP: 6.5
PROT UR STRIP-MCNC: NORMAL
SP GR UR STRIP: 1.02

## 2023-03-09 PROCEDURE — 81003 URINALYSIS AUTO W/O SCOPE: CPT | Mod: QW

## 2023-03-09 PROCEDURE — 0502F SUBSEQUENT PRENATAL CARE: CPT

## 2023-03-14 ENCOUNTER — ASOB RESULT (OUTPATIENT)
Age: 28
End: 2023-03-14

## 2023-03-14 ENCOUNTER — APPOINTMENT (OUTPATIENT)
Dept: ANTEPARTUM | Facility: CLINIC | Age: 28
End: 2023-03-14
Payer: COMMERCIAL

## 2023-03-14 PROCEDURE — 76816 OB US FOLLOW-UP PER FETUS: CPT

## 2023-03-22 ENCOUNTER — NON-APPOINTMENT (OUTPATIENT)
Age: 28
End: 2023-03-22

## 2023-03-23 ENCOUNTER — APPOINTMENT (OUTPATIENT)
Dept: OBGYN | Facility: CLINIC | Age: 28
End: 2023-03-23
Payer: COMMERCIAL

## 2023-03-23 ENCOUNTER — INPATIENT (INPATIENT)
Facility: HOSPITAL | Age: 28
LOS: 0 days | Discharge: HOME | DRG: 833 | End: 2023-03-24
Attending: OBSTETRICS & GYNECOLOGY | Admitting: OBSTETRICS & GYNECOLOGY
Payer: COMMERCIAL

## 2023-03-23 VITALS
SYSTOLIC BLOOD PRESSURE: 160 MMHG | HEIGHT: 63 IN | WEIGHT: 164 LBS | BODY MASS INDEX: 29.06 KG/M2 | DIASTOLIC BLOOD PRESSURE: 100 MMHG

## 2023-03-23 DIAGNOSIS — O14.90 UNSPECIFIED PRE-ECLAMPSIA, UNSPECIFIED TRIMESTER: ICD-10-CM

## 2023-03-23 DIAGNOSIS — Z34.93 ENCOUNTER FOR SUPERVISION OF NORMAL PREGNANCY, UNSPECIFIED, THIRD TRIMESTER: ICD-10-CM

## 2023-03-23 DIAGNOSIS — Z3A.36 36 WEEKS GESTATION OF PREGNANCY: ICD-10-CM

## 2023-03-23 DIAGNOSIS — Z3A.35 35 WEEKS GESTATION OF PREGNANCY: ICD-10-CM

## 2023-03-23 DIAGNOSIS — O26.899 OTHER SPECIFIED PREGNANCY RELATED CONDITIONS, UNSPECIFIED TRIMESTER: ICD-10-CM

## 2023-03-23 DIAGNOSIS — Z3A.00 WEEKS OF GESTATION OF PREGNANCY NOT SPECIFIED: ICD-10-CM

## 2023-03-23 LAB
ALBUMIN SERPL ELPH-MCNC: 2.7 G/DL — LOW (ref 3.3–5)
ALP SERPL-CCNC: 132 U/L — HIGH (ref 40–120)
ALT FLD-CCNC: 29 U/L — SIGNIFICANT CHANGE UP (ref 12–78)
ANION GAP SERPL CALC-SCNC: 8 MMOL/L — SIGNIFICANT CHANGE UP (ref 5–17)
APPEARANCE UR: CLEAR — SIGNIFICANT CHANGE UP
APTT BLD: 25.6 SEC — LOW (ref 27.5–35.5)
AST SERPL-CCNC: 32 U/L — SIGNIFICANT CHANGE UP (ref 15–37)
BACTERIA # UR AUTO: ABNORMAL
BASOPHILS # BLD AUTO: 0.05 K/UL — SIGNIFICANT CHANGE UP (ref 0–0.2)
BASOPHILS NFR BLD AUTO: 0.4 % — SIGNIFICANT CHANGE UP (ref 0–2)
BILIRUB SERPL-MCNC: 0.3 MG/DL — SIGNIFICANT CHANGE UP (ref 0.2–1.2)
BILIRUB UR QL STRIP: NORMAL
BILIRUB UR-MCNC: NEGATIVE — SIGNIFICANT CHANGE UP
BLD GP AB SCN SERPL QL: SIGNIFICANT CHANGE UP
BUN SERPL-MCNC: 8 MG/DL — SIGNIFICANT CHANGE UP (ref 7–23)
CALCIUM SERPL-MCNC: 9.1 MG/DL — SIGNIFICANT CHANGE UP (ref 8.5–10.1)
CHLORIDE SERPL-SCNC: 109 MMOL/L — HIGH (ref 96–108)
CLARITY UR: NORMAL
CO2 SERPL-SCNC: 19 MMOL/L — LOW (ref 22–31)
COLLECTION METHOD: NORMAL
COLOR SPEC: YELLOW — SIGNIFICANT CHANGE UP
COMMENT - URINE: SIGNIFICANT CHANGE UP
CREAT ?TM UR-MCNC: 40 MG/DL — SIGNIFICANT CHANGE UP
CREAT SERPL-MCNC: 0.49 MG/DL — LOW (ref 0.5–1.3)
DIFF PNL FLD: NEGATIVE — SIGNIFICANT CHANGE UP
EGFR: 131 ML/MIN/1.73M2 — SIGNIFICANT CHANGE UP
EOSINOPHIL # BLD AUTO: 0.06 K/UL — SIGNIFICANT CHANGE UP (ref 0–0.5)
EOSINOPHIL NFR BLD AUTO: 0.5 % — SIGNIFICANT CHANGE UP (ref 0–6)
EPI CELLS # UR: SIGNIFICANT CHANGE UP
FIBRINOGEN PPP-MCNC: 878 MG/DL — HIGH (ref 330–520)
GLUCOSE SERPL-MCNC: 91 MG/DL — SIGNIFICANT CHANGE UP (ref 70–99)
GLUCOSE UR QL: NEGATIVE — SIGNIFICANT CHANGE UP
GLUCOSE UR-MCNC: NORMAL
HCG UR QL: 0.2 EU/DL
HCT VFR BLD CALC: 35.7 % — SIGNIFICANT CHANGE UP (ref 34.5–45)
HGB BLD-MCNC: 11.9 G/DL — SIGNIFICANT CHANGE UP (ref 11.5–15.5)
HGB UR QL STRIP.AUTO: NORMAL
IMM GRANULOCYTES NFR BLD AUTO: 1.3 % — HIGH (ref 0–0.9)
INR BLD: 0.96 RATIO — SIGNIFICANT CHANGE UP (ref 0.88–1.16)
KETONES UR-MCNC: ABNORMAL
KETONES UR-MCNC: NORMAL
LDH SERPL L TO P-CCNC: 263 U/L — HIGH (ref 84–241)
LEUKOCYTE ESTERASE UR QL STRIP: NORMAL
LEUKOCYTE ESTERASE UR-ACNC: ABNORMAL
LYMPHOCYTES # BLD AUTO: 1.57 K/UL — SIGNIFICANT CHANGE UP (ref 1–3.3)
LYMPHOCYTES # BLD AUTO: 13.1 % — SIGNIFICANT CHANGE UP (ref 13–44)
MCHC RBC-ENTMCNC: 27.9 PG — SIGNIFICANT CHANGE UP (ref 27–34)
MCHC RBC-ENTMCNC: 33.3 GM/DL — SIGNIFICANT CHANGE UP (ref 32–36)
MCV RBC AUTO: 83.6 FL — SIGNIFICANT CHANGE UP (ref 80–100)
MONOCYTES # BLD AUTO: 0.75 K/UL — SIGNIFICANT CHANGE UP (ref 0–0.9)
MONOCYTES NFR BLD AUTO: 6.3 % — SIGNIFICANT CHANGE UP (ref 2–14)
NEUTROPHILS # BLD AUTO: 9.36 K/UL — HIGH (ref 1.8–7.4)
NEUTROPHILS NFR BLD AUTO: 78.4 % — HIGH (ref 43–77)
NITRITE UR QL STRIP: NORMAL
NITRITE UR-MCNC: NEGATIVE — SIGNIFICANT CHANGE UP
PH UR STRIP: 6
PH UR: 6 — SIGNIFICANT CHANGE UP (ref 5–8)
PLATELET # BLD AUTO: 230 K/UL — SIGNIFICANT CHANGE UP (ref 150–400)
POTASSIUM SERPL-MCNC: 3.8 MMOL/L — SIGNIFICANT CHANGE UP (ref 3.5–5.3)
POTASSIUM SERPL-SCNC: 3.8 MMOL/L — SIGNIFICANT CHANGE UP (ref 3.5–5.3)
PROT ?TM UR-MCNC: 23 MG/DL — HIGH (ref 0–12)
PROT SERPL-MCNC: 7.3 GM/DL — SIGNIFICANT CHANGE UP (ref 6–8.3)
PROT UR STRIP-MCNC: NORMAL
PROT UR-MCNC: 30 MG/DL
PROT/CREAT UR-RTO: 0.6 RATIO — HIGH (ref 0–0.2)
PROTHROM AB SERPL-ACNC: 11.1 SEC — SIGNIFICANT CHANGE UP (ref 10.5–13.4)
RBC # BLD: 4.27 M/UL — SIGNIFICANT CHANGE UP (ref 3.8–5.2)
RBC # FLD: 12.9 % — SIGNIFICANT CHANGE UP (ref 10.3–14.5)
RBC CASTS # UR COMP ASSIST: ABNORMAL /HPF (ref 0–4)
SARS-COV-2 RNA SPEC QL NAA+PROBE: SIGNIFICANT CHANGE UP
SODIUM SERPL-SCNC: 136 MMOL/L — SIGNIFICANT CHANGE UP (ref 135–145)
SP GR SPEC: 1.01 — SIGNIFICANT CHANGE UP (ref 1.01–1.02)
SP GR UR STRIP: 1.01
URATE SERPL-MCNC: 3.9 MG/DL — SIGNIFICANT CHANGE UP (ref 2.5–7)
UROBILINOGEN FLD QL: NEGATIVE — SIGNIFICANT CHANGE UP
WBC # BLD: 11.94 K/UL — HIGH (ref 3.8–10.5)
WBC # FLD AUTO: 11.94 K/UL — HIGH (ref 3.8–10.5)
WBC UR QL: SIGNIFICANT CHANGE UP /HPF (ref 0–5)

## 2023-03-23 PROCEDURE — 36415 COLL VENOUS BLD VENIPUNCTURE: CPT

## 2023-03-23 PROCEDURE — 0502F SUBSEQUENT PRENATAL CARE: CPT

## 2023-03-23 PROCEDURE — 85384 FIBRINOGEN ACTIVITY: CPT

## 2023-03-23 PROCEDURE — 81003 URINALYSIS AUTO W/O SCOPE: CPT | Mod: QW

## 2023-03-23 PROCEDURE — 99214 OFFICE O/P EST MOD 30 MIN: CPT

## 2023-03-23 PROCEDURE — 84156 ASSAY OF PROTEIN URINE: CPT

## 2023-03-23 PROCEDURE — 96372 THER/PROPH/DIAG INJ SC/IM: CPT

## 2023-03-23 PROCEDURE — 83615 LACTATE (LD) (LDH) ENZYME: CPT

## 2023-03-23 PROCEDURE — 87635 SARS-COV-2 COVID-19 AMP PRB: CPT

## 2023-03-23 PROCEDURE — 85730 THROMBOPLASTIN TIME PARTIAL: CPT

## 2023-03-23 PROCEDURE — 85025 COMPLETE CBC W/AUTO DIFF WBC: CPT

## 2023-03-23 PROCEDURE — 76815 OB US LIMITED FETUS(S): CPT

## 2023-03-23 PROCEDURE — 86769 SARS-COV-2 COVID-19 ANTIBODY: CPT

## 2023-03-23 PROCEDURE — 82570 ASSAY OF URINE CREATININE: CPT

## 2023-03-23 PROCEDURE — 86901 BLOOD TYPING SEROLOGIC RH(D): CPT

## 2023-03-23 PROCEDURE — 59050 FETAL MONITOR W/REPORT: CPT

## 2023-03-23 PROCEDURE — 81001 URINALYSIS AUTO W/SCOPE: CPT

## 2023-03-23 PROCEDURE — 86850 RBC ANTIBODY SCREEN: CPT

## 2023-03-23 PROCEDURE — 76819 FETAL BIOPHYS PROFIL W/O NST: CPT

## 2023-03-23 PROCEDURE — 84550 ASSAY OF BLOOD/URIC ACID: CPT

## 2023-03-23 PROCEDURE — 86780 TREPONEMA PALLIDUM: CPT

## 2023-03-23 PROCEDURE — C1889: CPT

## 2023-03-23 PROCEDURE — 85610 PROTHROMBIN TIME: CPT

## 2023-03-23 PROCEDURE — 94760 N-INVAS EAR/PLS OXIMETRY 1: CPT

## 2023-03-23 PROCEDURE — 85027 COMPLETE CBC AUTOMATED: CPT

## 2023-03-23 PROCEDURE — 59025 FETAL NON-STRESS TEST: CPT

## 2023-03-23 PROCEDURE — 86900 BLOOD TYPING SEROLOGIC ABO: CPT

## 2023-03-23 PROCEDURE — 80053 COMPREHEN METABOLIC PANEL: CPT

## 2023-03-23 RX ORDER — AMPICILLIN TRIHYDRATE 250 MG
2 CAPSULE ORAL ONCE
Refills: 0 | Status: DISCONTINUED | OUTPATIENT
Start: 2023-03-23 | End: 2023-03-23

## 2023-03-23 RX ORDER — CHLORHEXIDINE GLUCONATE 213 G/1000ML
1 SOLUTION TOPICAL ONCE
Refills: 0 | Status: DISCONTINUED | OUTPATIENT
Start: 2023-03-23 | End: 2023-03-23

## 2023-03-23 RX ORDER — OXYTOCIN 10 UNIT/ML
333.33 VIAL (ML) INJECTION
Qty: 20 | Refills: 0 | Status: DISCONTINUED | OUTPATIENT
Start: 2023-03-23 | End: 2023-03-23

## 2023-03-23 RX ORDER — AMPICILLIN TRIHYDRATE 250 MG
1 CAPSULE ORAL EVERY 4 HOURS
Refills: 0 | Status: DISCONTINUED | OUTPATIENT
Start: 2023-03-23 | End: 2023-03-23

## 2023-03-23 RX ORDER — SODIUM CHLORIDE 9 MG/ML
1000 INJECTION, SOLUTION INTRAVENOUS
Refills: 0 | Status: DISCONTINUED | OUTPATIENT
Start: 2023-03-23 | End: 2023-03-23

## 2023-03-23 RX ORDER — CITRIC ACID/SODIUM CITRATE 300-500 MG
15 SOLUTION, ORAL ORAL EVERY 6 HOURS
Refills: 0 | Status: DISCONTINUED | OUTPATIENT
Start: 2023-03-23 | End: 2023-03-23

## 2023-03-23 RX ADMIN — Medication 12 MILLIGRAM(S): at 21:41

## 2023-03-23 NOTE — CONSULT NOTE ADULT - ASSESSMENT
28 y/o  @36.0wks by LMP (LMP: 7/15/22; SCAR 23)) presents to L&D due to elevated BP; meeting diagnosis for PEC w/o SF.    ALLIE consulted to due to further management and delivery time.     D/w Dr. Chaidez - M

## 2023-03-23 NOTE — CONSULT NOTE ADULT - PROBLEM SELECTOR RECOMMENDATION 2
- Patient meeting diagnosis for PEC w/o SF; based on BP and P/C ratio.   - Although reports BP of systolic 160 in the office around 1500; only one severe range BP noted upon arrival to hospital. All subsequent BP moderately elevated, not severe.   - PEC labs drawn and wnl with the exception of P/C = 0.6  - Patient asymptomatic.   - Plan for continued monitoring overnight of blood pressures.   - Patient to receive Betamethasone 12 mg x2 q 24 for ACS due to  gestational age.   - AM labs to be repeated.  - Delivery may be recommended if any changes in maternal symptoms, lab work, worsening of blood pressure or fetal changes. However, if stable overnight, can consider outpatient management with MFM twice weekly.

## 2023-03-23 NOTE — CONSULT NOTE ADULT - PROBLEM SELECTOR RECOMMENDATION 9
- 36 wk GA by LMP  - Patient previously on continuous monitoring: NST reactive; no ctx on toco; minimal intermittent period of uterine irritability   - Patient to now be monitored via NST BID  - Regular diet   - PNV  - Betamethasone administration for ACS  - Prenatal records obtained   - GBS swab drawn in office, results [place].

## 2023-03-23 NOTE — CONSULT NOTE ADULT - SUBJECTIVE AND OBJECTIVE BOX
CYRUS FRITZ    28 y/o  @36.0wks by LMP (LMP: 7/15/22; SCAR 23) presents to L&D due to elevated BP in the office. Pt was seen in the office earlier today and had a BP of 160/100 at around 3pm and was sent in for further evaluation. Pt states she was seen at 24wks with mild range BPs in the hospital. She was discharge and is currently not on medication. Denies headaches, fevers, chills, changes in vision, nausea, vomiting, or RUQ pain. Denies contractions, LOF, VB. +FM. GBS unknown (collected in the office today). EFW 2600g.     PNC: uncomplicated   ObHx: Primigravida   GynHx: Denies hx of fibroids, ovarian cysts, abnml PAP smears, STDs  MedHx: Denies hx of HTN, DM, asthma, thyroid problems, blood clots/bleeding problems, hx of blood transfusions  Meds: PNV  All: NKDA  PSHx: Denies  FHx: Denies hx of blood clots/bleeding problems  Social: Denies alcohol/tobacco/drug use in pregnancy  Psych: Denies hx of anxiety/depression         REVIEW OF SYSTEMS:    CONSTITUTIONAL: No weakness, fevers or chills  EYES/ENT: No visual changes;  No vertigo or throat pain   NECK: No pain or stiffness  RESPIRATORY: No cough, wheezing, hemoptysis; No shortness of breath  CARDIOVASCULAR: No chest pain or palpitations  GASTROINTESTINAL: No abdominal or epigastric pain. No nausea, vomiting, or hematemesis; No diarrhea or constipation. No melena or hematochezia.  GENITOURINARY: No dysuria, frequency or hematuria  NEUROLOGICAL: No numbness or weakness  SKIN: No itching, burning, rashes, or lesions   All other review of systems is negative unless indicated above.    Vital Signs:  On CPN  - Of noted on arrival BP noted to be 161/103 @ 1812. Subsequent BP elevated, however, not severe range.         Physical Exam:  Gen: NAD  CV: NRRR  Lungs: CTA  Abd: soft, gravid, non-tender  EFM: 150bpm, moderate variability, +accels, -decels  [place]: no contractions   BSUS: vtx      Labs:                          11.9   11.94 )-----------( 230      ( 23 Mar 2023 18:37 )             35.7         136  |  109<H>  |  8   ----------------------------<  91  3.8   |  19<L>  |  0.49<L>    Ca    9.1      23 Mar 2023 18:37    TPro  7.3  /  Alb  2.7<L>  /  TBili  0.3  /  DBili  x   /  AST  32  /  ALT  29  /  AlkPhos  132<H>      PT/INR - ( 23 Mar 2023 18:37 )   PT: 11.1 sec;   INR: 0.96 ratio         PTT - ( 23 Mar 2023 18:37 )  PTT:25.6 sec

## 2023-03-24 ENCOUNTER — TRANSCRIPTION ENCOUNTER (OUTPATIENT)
Age: 28
End: 2023-03-24

## 2023-03-24 LAB
ALBUMIN SERPL ELPH-MCNC: 2.6 G/DL — LOW (ref 3.3–5)
ALP SERPL-CCNC: 128 U/L — HIGH (ref 40–120)
ALT FLD-CCNC: 27 U/L — SIGNIFICANT CHANGE UP (ref 12–78)
ANION GAP SERPL CALC-SCNC: 8 MMOL/L — SIGNIFICANT CHANGE UP (ref 5–17)
AST SERPL-CCNC: 27 U/L — SIGNIFICANT CHANGE UP (ref 15–37)
BILIRUB SERPL-MCNC: 0.4 MG/DL — SIGNIFICANT CHANGE UP (ref 0.2–1.2)
BUN SERPL-MCNC: 8 MG/DL — SIGNIFICANT CHANGE UP (ref 7–23)
CALCIUM SERPL-MCNC: 9 MG/DL — SIGNIFICANT CHANGE UP (ref 8.5–10.1)
CHLORIDE SERPL-SCNC: 109 MMOL/L — HIGH (ref 96–108)
CO2 SERPL-SCNC: 22 MMOL/L — SIGNIFICANT CHANGE UP (ref 22–31)
COVID-19 SPIKE DOMAIN AB INTERP: POSITIVE
COVID-19 SPIKE DOMAIN ANTIBODY RESULT: >250 U/ML — HIGH
CREAT SERPL-MCNC: 0.56 MG/DL — SIGNIFICANT CHANGE UP (ref 0.5–1.3)
EGFR: 127 ML/MIN/1.73M2 — SIGNIFICANT CHANGE UP
FIBRINOGEN PPP-MCNC: 842 MG/DL — HIGH (ref 330–520)
GLUCOSE SERPL-MCNC: 141 MG/DL — HIGH (ref 70–99)
HCT VFR BLD CALC: 36.1 % — SIGNIFICANT CHANGE UP (ref 34.5–45)
HGB BLD-MCNC: 11.7 G/DL — SIGNIFICANT CHANGE UP (ref 11.5–15.5)
INR BLD: 1.01 RATIO — SIGNIFICANT CHANGE UP (ref 0.88–1.16)
MCHC RBC-ENTMCNC: 27.9 PG — SIGNIFICANT CHANGE UP (ref 27–34)
MCHC RBC-ENTMCNC: 32.4 GM/DL — SIGNIFICANT CHANGE UP (ref 32–36)
MCV RBC AUTO: 86 FL — SIGNIFICANT CHANGE UP (ref 80–100)
PLATELET # BLD AUTO: 203 K/UL — SIGNIFICANT CHANGE UP (ref 150–400)
POTASSIUM SERPL-MCNC: 4 MMOL/L — SIGNIFICANT CHANGE UP (ref 3.5–5.3)
POTASSIUM SERPL-SCNC: 4 MMOL/L — SIGNIFICANT CHANGE UP (ref 3.5–5.3)
PROT ?TM UR-MCNC: 23 MG/DL — HIGH (ref 0–12)
PROT SERPL-MCNC: 7.1 GM/DL — SIGNIFICANT CHANGE UP (ref 6–8.3)
PROTHROM AB SERPL-ACNC: 11.7 SEC — SIGNIFICANT CHANGE UP (ref 10.5–13.4)
RBC # BLD: 4.2 M/UL — SIGNIFICANT CHANGE UP (ref 3.8–5.2)
RBC # FLD: 12.9 % — SIGNIFICANT CHANGE UP (ref 10.3–14.5)
SARS-COV-2 IGG+IGM SERPL QL IA: >250 U/ML — HIGH
SARS-COV-2 IGG+IGM SERPL QL IA: POSITIVE
SODIUM SERPL-SCNC: 139 MMOL/L — SIGNIFICANT CHANGE UP (ref 135–145)
T PALLIDUM AB TITR SER: NEGATIVE — SIGNIFICANT CHANGE UP
WBC # BLD: 11.14 K/UL — HIGH (ref 3.8–10.5)
WBC # FLD AUTO: 11.14 K/UL — HIGH (ref 3.8–10.5)

## 2023-03-24 PROCEDURE — 76815 OB US LIMITED FETUS(S): CPT | Mod: 26

## 2023-03-24 PROCEDURE — 76819 FETAL BIOPHYS PROFIL W/O NST: CPT | Mod: 26

## 2023-03-24 RX ADMIN — Medication 12 MILLIGRAM(S): at 20:25

## 2023-03-24 NOTE — DISCHARGE NOTE ANTEPARTUM - PATIENT PORTAL LINK FT
You can access the FollowMyHealth Patient Portal offered by [facility] by registering at the following website: [email or web address]. By joining [employer]’s FollowMyHealth portal, you will also be able to view your health information using other applications (apps) compatible with our system.

## 2023-03-24 NOTE — DISCHARGE NOTE ANTEPARTUM - PLAN OF CARE
Patient with diagnosis of preeclampsia without severe features. Blood pressure in moderate range. Patient received Betamethasone x2 and monitored inpatient. Patient with diagnosis of preeclampsia without severe features. Blood pressure in moderate range. Patient received Betamethasone x2 and monitored inpatient. No other issues in BP or labwork noted. Patient to have close monitoring outpatient. Come back to labor and delivery if you experience a headache that is not relieved with pain medication, changes in your vision, severe abdominal pain in the upper right part of the belly

## 2023-03-24 NOTE — PROGRESS NOTE ADULT - PROBLEM SELECTOR PLAN 1
- Patient with no obstetric complaints  - Patient on continuous monitoring since 1000, consistently reactive  - [place] with irregular contractions  - S/p BMZ x1 on 3/23 @ 2100, for second dose today at 2100  - GBS swab drawn in office, results pending  - BPP performed by radiology 6/8 for fetal breathing. Repeat performed at bedside 8/8.  - Fetal status overall reassuring at this time. To return to triage if she experiences contraction pains every 4-5min that are worsening in frequency and intensity, vaginal bleeding, large volume leakage of fluid, or decreased fetal movement.

## 2023-03-24 NOTE — PROGRESS NOTE ADULT - SUBJECTIVE AND OBJECTIVE BOX
28 y/o  @36.0wks by LMP 7/15/22 SCAR 23 on HD1 admitted for BP monitoring. Now PECwoSF based on elevated BPs and P:C 0.6.    S/p BMZ x1 at 2100 on 3/23.  Denies HA, vision changes, SOB, leg swelling, RUQ pain.  Denies VB, LOF, CTXs. Reports active FM.    Vitals per CPN.  Gen: NAD, AAOx3  Abd: soft, NT, gravid  Ext: no pitting edema b/l    FHT: 125bpm, reactive  [place]: irregular contractions    Bedside sono 3/24 @ 1600: BPP 8/8, MVP 4x5, cephalic, left lateral placenta    < from: US Fetal Bio Profile w/o Non-Stress (23 @ 08:38) >  NTERPRETATION:  Transabdominal obstetrical ultrasound for fetal position   and biophysical profile    Indication: Pregnant woman for biophysical profile.    Transabdominal obstetrical ultrasound is performed for fetal position and   biophysical profile. A single intrauterine gestation is seen.The fetal   presentation is cephalic. The cervix is not visualized due to shadowing   from the fetal skull. The placenta is posterior in location and it   appears grossly intact. There is positive fetal cardiac activity at 147   BPM. The LAURA ebtqqntr36.7 cm which is within normal limits.    Fetal measurements for gestational age:  Biparietal diameter 8.81 cm, 35 weeks and 4days  Head circumference 31.44 cm, 35 weeks and 2 days  Abdominal circumference 31.07 cm, 35 weeks and 0 days  Femoral length 6.82 cm, 35 weeks and 0 days    The average gestational age by ultrasound is 35 weeks and 2 days.   Expected date of delivery by ultrasound is 2023.    Biophysical profile receives a score of 6 out of 8:  Movement: 2  Tone: 2  Breathin  Fluid: 2  Estimated fetal weight is 2592 g    The fetal stomach, kidneys and urinary bladder appear grossly   unremarkable.    Impression: Single live intrauterine gestation with a biophysical profile   of 6 out of 8.    PA Ms. Whitlock is informed withread back.    --- End of Report ---    < end of copied text >                          11.7   11.14 )-----------( 203      ( 24 Mar 2023 10:03 )             36.1         139  |  109<H>  |  8   ----------------------------<  141<H>  4.0   |  22  |  0.56    Ca    9.0      24 Mar 2023 10:03    TPro  7.1  /  Alb  2.6<L>  /  TBili  0.4  /  DBili  x   /  AST  27  /  ALT  27  /  AlkPhos  128<H>  03-24    Fibrinogen Assay: 842  Prothrombin Time, Plasma: 11.7 sec (23 @ 10:03)  INR: 1.01    Protein/Creatinine Ratio Calculation: 0.6 Ratio (23 @ 18:37)

## 2023-03-24 NOTE — DISCHARGE NOTE ANTEPARTUM - HOSPITAL COURSE
Patient with diagnosis of preeclampsia without severe features. Blood pressure in moderate range. Patient received Betamethasone x2 and monitored inpatient. No other issues in BP or labwork noted. Patient to have close monitoring outpatient.

## 2023-03-24 NOTE — DISCHARGE NOTE ANTEPARTUM - FLU SEASON?
Section I - General Information    Name of Patient: Fortunato Jin                 : 1940    Medicare #: [id number]  Transport Date: 2021 (PCS is valid for round trips on this date and for all repetitive trips in the 60-day range as noted below )  Origin: [id number] [address]                                                         Destination: [facility]  Is the pt's stay covered under Medicare Part A (PPS/DRG)   [x]     Closest appropriate facility? If no, why is transport to more distant facility required? Yes  If hospice pt, is this transport related to pt's terminal illness? Yes       Section II - Medical Necessity Questionnaire  Ambulance transportation is medically necessary only if other means of transport are contraindicated or would be potentially harmful to the patient  To meet this requirement, the patient must either be "bed confined" or suffer from a condition such that transport by means other than ambulance is contraindicated by the patient's condition  The following questions must be answered by the medical professional signing below for this form to be valid:    1)  Describe the MEDICAL CONDITION (physical and/or mental) of this patient AT [address] [place] [address] that requires the patient to be transported in an ambulance and why transport by other means is contraindicated by the patient's condition: hospice, on oxygen, bed bound  2) Is the patient "bed confined" as defined below? Yes  To be "be confined" the patient must satisfy all three of the following conditions: (1) unable to get up from bed without Assistance; AND (2) unable to ambulate; AND (3) unable to sit in a chair or wheelchair  3) Can this patient safely be transported by car or wheelchair van (i e , seated during transport without a medical attendant or monitoring)?    No    4) In addition to completing questions 1-3 above, please check any of the following conditions that apply*:   *Note: supporting documentation for any boxes checked must be maintained in the patient's medical records  If hosp-hosp transfer, describe services needed at 2nd facility not available at 1st facility? Medical attendant required   Requires oxygen-unable to self administer  Unable to tolerate seated position for time needed to transport   Other(specify) hospice      Section III - Signature of Physician or Healthcare Professional  I certify that the above information is true and correct based on my evaluation of this patient, and represent that the patient requires transport by ambulance and that other forms of transport are contraindicated  I understand that this information will be used by the Centers for Medicare and Medicaid Services (CMS) to support the determination of medical necessity for ambulance services, and I represent that I have personal knowledge of the patient's condition at time of transport  []  If this box is checked, I also certify that the patient is physically or mentally incapable of signing the ambulance service's claim and that the institution with which I am affiliated has furnished care, services, or assistance to the patient  My signature below is made on behalf of the patient pursuant to 42 CFR §424 36(b)(4)  In accordance with 42 CFR §424 37, the specific reason(s) that the patient is physically or mentally incapable of signing the claim form is as follows  Signature of Physician* or Healthcare Professional______________________________________________________________  Signature Date 09/16/21 (For scheduled repetitive transports, this form is not valid for transports performed more than 60 days after this date)    Printed Name & Credentials of Physician or Healthcare Professional (MD, , RN, etc )__Diamond Holguin RN______________________________  *Form must be signed by patient's attending physician for scheduled, repetitive transports   For non-repetitive, unscheduled ambulance transports, if unable to obtain the signature of the attending physician, any of the following may sign (choose appropriate option below)  [] Physician Assistant []  Clinical Nurse Specialist [x]  Registered Nurse  []  Nurse Practitioner  [x] Discharge Planner Yes...

## 2023-03-24 NOTE — PROGRESS NOTE ADULT - ASSESSMENT
28 y/o  @36.0wks by LMP 7/15/22 SCAR 23 on HD1 admitted for BP monitoring. Now PECwoSF based on elevated BPs and P:C 0.6.

## 2023-03-24 NOTE — DISCHARGE NOTE ANTEPARTUM - CARE PROVIDERS DIRECT ADDRESSES
[email or web address]

## 2023-03-24 NOTE — DISCHARGE NOTE ANTEPARTUM - PROVIDER TOKENS
PROVIDER:[TOKEN:[[id number]]] PROVIDER:[TOKEN:[[id number]]],PROVIDER:[TOKEN:[[id number]]]

## 2023-03-24 NOTE — PROGRESS NOTE ADULT - PROBLEM SELECTOR PLAN 2
- Patient with two severe range BPS yesterday less than 4 hours apart. All subsequent BP normal to moderately elevated with no additional severe range pressures during admission. Patient now PEC w/o SF based on BPs and P/C ratio of 0.6.  - Consistently asymptomatic.  - P:C 0.6 with remainder of PIH labs wnl on admission and on HD1.  - Will c/w BP monitoring until next dose of BMZ given. If no additional SR pressures and continues to be asymptomatic, patient cleared for discharge home with 2x/weekly NST/BPP at [facility] and 2x daily BP monitoring at home. To be scheduled for IOL @ 37wga for PECwoSF.  - To call office or return to triage if BPs >150s/100s, or if symptoms such as headache refractory to tylenol,  shortness of breath, RUQ pain, leg swelling, or vision changes.

## 2023-03-24 NOTE — DISCHARGE NOTE ANTEPARTUM - CARE PLAN
Principal Discharge DX:	Preeclampsia  Assessment and plan of treatment:	Patient with diagnosis of preeclampsia without severe features. Blood pressure in moderate range. Patient received Betamethasone x2 and monitored inpatient.  Secondary Diagnosis:	36 weeks gestation of pregnancy   1 Principal Discharge DX:	Preeclampsia  Assessment and plan of treatment:	Patient with diagnosis of preeclampsia without severe features. Blood pressure in moderate range. Patient received Betamethasone x2 and monitored inpatient. No other issues in BP or labwork noted. Patient to have close monitoring outpatient. Come back to labor and delivery if you experience a headache that is not relieved with pain medication, changes in your vision, severe abdominal pain in the upper right part of the belly  Secondary Diagnosis:	36 weeks gestation of pregnancy

## 2023-03-24 NOTE — DISCHARGE NOTE ANTEPARTUM - CARE PROVIDER_API CALL
Rasheed López)  Obstetrics and Gynecology  [address] [place] [id number]  Phone: [phone]  Fax: [phone]  Follow Up Time:    Rasheed López)  Obstetrics and Gynecology  [address] [place], [place] [id number]  Phone: [phone]  Fax: [phone]  Follow Up Time:     Zana Chaidez)  MaternalFetal Medicine; Obstetrics and Gynecology  [address] [place] [address], [address]  [place] [address]  Phone: [phone]  Fax: [phone]  Follow Up Time:

## 2023-03-27 ENCOUNTER — APPOINTMENT (OUTPATIENT)
Dept: ANTEPARTUM | Facility: CLINIC | Age: 28
End: 2023-03-27

## 2023-03-27 ENCOUNTER — NON-APPOINTMENT (OUTPATIENT)
Age: 28
End: 2023-03-27

## 2023-03-27 ENCOUNTER — OUTPATIENT (OUTPATIENT)
Dept: INPATIENT UNIT | Facility: HOSPITAL | Age: 28
LOS: 1 days | Discharge: HOME | End: 2023-03-27
Payer: COMMERCIAL

## 2023-03-27 DIAGNOSIS — Z3A.36 36 WEEKS GESTATION OF PREGNANCY: ICD-10-CM

## 2023-03-27 LAB
ALBUMIN SERPL ELPH-MCNC: 2.6 G/DL — LOW (ref 3.3–5)
ALP SERPL-CCNC: 118 U/L — SIGNIFICANT CHANGE UP (ref 40–120)
ALT FLD-CCNC: 29 U/L — SIGNIFICANT CHANGE UP (ref 12–78)
ANION GAP SERPL CALC-SCNC: 7 MMOL/L — SIGNIFICANT CHANGE UP (ref 5–17)
APPEARANCE UR: CLEAR — SIGNIFICANT CHANGE UP
APTT BLD: 22.9 SEC — LOW (ref 27.5–35.5)
AST SERPL-CCNC: 31 U/L — SIGNIFICANT CHANGE UP (ref 15–37)
BACTERIA # UR AUTO: ABNORMAL
BILIRUB SERPL-MCNC: 0.3 MG/DL — SIGNIFICANT CHANGE UP (ref 0.2–1.2)
BILIRUB UR-MCNC: NEGATIVE — SIGNIFICANT CHANGE UP
BLD GP AB SCN SERPL QL: SIGNIFICANT CHANGE UP
BUN SERPL-MCNC: 10 MG/DL — SIGNIFICANT CHANGE UP (ref 7–23)
CALCIUM SERPL-MCNC: 9 MG/DL — SIGNIFICANT CHANGE UP (ref 8.5–10.1)
CHLORIDE SERPL-SCNC: 110 MMOL/L — HIGH (ref 96–108)
CO2 SERPL-SCNC: 22 MMOL/L — SIGNIFICANT CHANGE UP (ref 22–31)
COLOR SPEC: YELLOW — SIGNIFICANT CHANGE UP
CREAT ?TM UR-MCNC: 159 MG/DL — SIGNIFICANT CHANGE UP
CREAT SERPL-MCNC: 0.49 MG/DL — LOW (ref 0.5–1.3)
DIFF PNL FLD: ABNORMAL
EGFR: 131 ML/MIN/1.73M2 — SIGNIFICANT CHANGE UP
EPI CELLS # UR: SIGNIFICANT CHANGE UP
FIBRINOGEN PPP-MCNC: 594 MG/DL — HIGH (ref 330–520)
GLUCOSE SERPL-MCNC: 81 MG/DL — SIGNIFICANT CHANGE UP (ref 70–99)
GLUCOSE UR QL: NEGATIVE — SIGNIFICANT CHANGE UP
HCT VFR BLD CALC: 34.4 % — LOW (ref 34.5–45)
HGB BLD-MCNC: 11.1 G/DL — LOW (ref 11.5–15.5)
INR BLD: 0.97 RATIO — SIGNIFICANT CHANGE UP (ref 0.88–1.16)
KETONES UR-MCNC: NEGATIVE — SIGNIFICANT CHANGE UP
LDH SERPL L TO P-CCNC: 200 U/L — SIGNIFICANT CHANGE UP (ref 84–241)
LEUKOCYTE ESTERASE UR-ACNC: ABNORMAL
MCHC RBC-ENTMCNC: 27.7 PG — SIGNIFICANT CHANGE UP (ref 27–34)
MCHC RBC-ENTMCNC: 32.3 GM/DL — SIGNIFICANT CHANGE UP (ref 32–36)
MCV RBC AUTO: 85.8 FL — SIGNIFICANT CHANGE UP (ref 80–100)
NITRITE UR-MCNC: NEGATIVE — SIGNIFICANT CHANGE UP
PH UR: 6 — SIGNIFICANT CHANGE UP (ref 5–8)
PLATELET # BLD AUTO: 210 K/UL — SIGNIFICANT CHANGE UP (ref 150–400)
POTASSIUM SERPL-MCNC: 3.6 MMOL/L — SIGNIFICANT CHANGE UP (ref 3.5–5.3)
POTASSIUM SERPL-SCNC: 3.6 MMOL/L — SIGNIFICANT CHANGE UP (ref 3.5–5.3)
PROT ?TM UR-MCNC: 84 MG/DL — HIGH (ref 0–12)
PROT SERPL-MCNC: 7 GM/DL — SIGNIFICANT CHANGE UP (ref 6–8.3)
PROT UR-MCNC: 30 MG/DL
PROT/CREAT UR-RTO: 0.5 RATIO — HIGH (ref 0–0.2)
PROTHROM AB SERPL-ACNC: 11.2 SEC — SIGNIFICANT CHANGE UP (ref 10.5–13.4)
RBC # BLD: 4.01 M/UL — SIGNIFICANT CHANGE UP (ref 3.8–5.2)
RBC # FLD: 13.1 % — SIGNIFICANT CHANGE UP (ref 10.3–14.5)
RBC CASTS # UR COMP ASSIST: NEGATIVE /HPF — SIGNIFICANT CHANGE UP (ref 0–4)
SODIUM SERPL-SCNC: 139 MMOL/L — SIGNIFICANT CHANGE UP (ref 135–145)
SP GR SPEC: 1.02 — SIGNIFICANT CHANGE UP (ref 1.01–1.02)
URATE SERPL-MCNC: 4.1 MG/DL — SIGNIFICANT CHANGE UP (ref 2.5–7)
UROBILINOGEN FLD QL: NEGATIVE — SIGNIFICANT CHANGE UP
WBC # BLD: 12.59 K/UL — HIGH (ref 3.8–10.5)
WBC # FLD AUTO: 12.59 K/UL — HIGH (ref 3.8–10.5)
WBC UR QL: ABNORMAL /HPF (ref 0–5)

## 2023-03-27 PROCEDURE — 99221 1ST HOSP IP/OBS SF/LOW 40: CPT

## 2023-03-27 PROCEDURE — 83615 LACTATE (LD) (LDH) ENZYME: CPT

## 2023-03-27 PROCEDURE — 84550 ASSAY OF BLOOD/URIC ACID: CPT

## 2023-03-27 PROCEDURE — 84156 ASSAY OF PROTEIN URINE: CPT

## 2023-03-27 PROCEDURE — 99214 OFFICE O/P EST MOD 30 MIN: CPT

## 2023-03-27 PROCEDURE — 85027 COMPLETE CBC AUTOMATED: CPT

## 2023-03-27 PROCEDURE — 86900 BLOOD TYPING SEROLOGIC ABO: CPT

## 2023-03-27 PROCEDURE — 59025 FETAL NON-STRESS TEST: CPT

## 2023-03-27 PROCEDURE — 80053 COMPREHEN METABOLIC PANEL: CPT

## 2023-03-27 PROCEDURE — 81001 URINALYSIS AUTO W/SCOPE: CPT

## 2023-03-27 PROCEDURE — 85610 PROTHROMBIN TIME: CPT

## 2023-03-27 PROCEDURE — 86850 RBC ANTIBODY SCREEN: CPT

## 2023-03-27 PROCEDURE — 85730 THROMBOPLASTIN TIME PARTIAL: CPT

## 2023-03-27 PROCEDURE — 86901 BLOOD TYPING SEROLOGIC RH(D): CPT

## 2023-03-27 PROCEDURE — 36415 COLL VENOUS BLD VENIPUNCTURE: CPT

## 2023-03-27 PROCEDURE — 82570 ASSAY OF URINE CREATININE: CPT

## 2023-03-27 PROCEDURE — 85384 FIBRINOGEN ACTIVITY: CPT

## 2023-03-28 ENCOUNTER — NON-APPOINTMENT (OUTPATIENT)
Age: 28
End: 2023-03-28

## 2023-03-28 ENCOUNTER — INPATIENT (INPATIENT)
Facility: HOSPITAL | Age: 28
LOS: 6 days | Discharge: HOME | End: 2023-04-04
Attending: OBSTETRICS & GYNECOLOGY | Admitting: OBSTETRICS & GYNECOLOGY
Payer: COMMERCIAL

## 2023-03-28 VITALS — HEIGHT: 63 IN | WEIGHT: 164.02 LBS

## 2023-03-28 DIAGNOSIS — Z3A.36 36 WEEKS GESTATION OF PREGNANCY: ICD-10-CM

## 2023-03-28 DIAGNOSIS — O26.899 OTHER SPECIFIED PREGNANCY RELATED CONDITIONS, UNSPECIFIED TRIMESTER: ICD-10-CM

## 2023-03-28 LAB
ALBUMIN SERPL ELPH-MCNC: 2.7 G/DL — LOW (ref 3.3–5)
ALP SERPL-CCNC: 133 U/L — HIGH (ref 40–120)
ALT FLD-CCNC: 30 U/L — SIGNIFICANT CHANGE UP (ref 12–78)
ANION GAP SERPL CALC-SCNC: 6 MMOL/L — SIGNIFICANT CHANGE UP (ref 5–17)
APTT BLD: 24.6 SEC — LOW (ref 27.5–35.5)
AST SERPL-CCNC: 35 U/L — SIGNIFICANT CHANGE UP (ref 15–37)
BASOPHILS # BLD AUTO: 0.05 K/UL — SIGNIFICANT CHANGE UP (ref 0–0.2)
BASOPHILS NFR BLD AUTO: 0.4 % — SIGNIFICANT CHANGE UP (ref 0–2)
BILIRUB SERPL-MCNC: 0.2 MG/DL — SIGNIFICANT CHANGE UP (ref 0.2–1.2)
BUN SERPL-MCNC: 10 MG/DL — SIGNIFICANT CHANGE UP (ref 7–23)
CALCIUM SERPL-MCNC: 9.4 MG/DL — SIGNIFICANT CHANGE UP (ref 8.5–10.1)
CHLORIDE SERPL-SCNC: 108 MMOL/L — SIGNIFICANT CHANGE UP (ref 96–108)
CO2 SERPL-SCNC: 24 MMOL/L — SIGNIFICANT CHANGE UP (ref 22–31)
CREAT SERPL-MCNC: 0.53 MG/DL — SIGNIFICANT CHANGE UP (ref 0.5–1.3)
EGFR: 128 ML/MIN/1.73M2 — SIGNIFICANT CHANGE UP
EOSINOPHIL # BLD AUTO: 0.1 K/UL — SIGNIFICANT CHANGE UP (ref 0–0.5)
EOSINOPHIL NFR BLD AUTO: 0.8 % — SIGNIFICANT CHANGE UP (ref 0–6)
FIBRINOGEN PPP-MCNC: 806 MG/DL — HIGH (ref 330–520)
GLUCOSE SERPL-MCNC: 79 MG/DL — SIGNIFICANT CHANGE UP (ref 70–99)
HCT VFR BLD CALC: 37.9 % — SIGNIFICANT CHANGE UP (ref 34.5–45)
HGB BLD-MCNC: 12.2 G/DL — SIGNIFICANT CHANGE UP (ref 11.5–15.5)
IMM GRANULOCYTES NFR BLD AUTO: 1.6 % — HIGH (ref 0–0.9)
INR BLD: 0.97 RATIO — SIGNIFICANT CHANGE UP (ref 0.88–1.16)
LDH SERPL L TO P-CCNC: 253 U/L — HIGH (ref 84–241)
LYMPHOCYTES # BLD AUTO: 1.4 K/UL — SIGNIFICANT CHANGE UP (ref 1–3.3)
LYMPHOCYTES # BLD AUTO: 11.1 % — LOW (ref 13–44)
MCHC RBC-ENTMCNC: 27.2 PG — SIGNIFICANT CHANGE UP (ref 27–34)
MCHC RBC-ENTMCNC: 32.2 GM/DL — SIGNIFICANT CHANGE UP (ref 32–36)
MCV RBC AUTO: 84.6 FL — SIGNIFICANT CHANGE UP (ref 80–100)
MONOCYTES # BLD AUTO: 0.94 K/UL — HIGH (ref 0–0.9)
MONOCYTES NFR BLD AUTO: 7.5 % — SIGNIFICANT CHANGE UP (ref 2–14)
NEUTROPHILS # BLD AUTO: 9.92 K/UL — HIGH (ref 1.8–7.4)
NEUTROPHILS NFR BLD AUTO: 78.6 % — HIGH (ref 43–77)
PLATELET # BLD AUTO: 261 K/UL — SIGNIFICANT CHANGE UP (ref 150–400)
POTASSIUM SERPL-MCNC: 4.1 MMOL/L — SIGNIFICANT CHANGE UP (ref 3.5–5.3)
POTASSIUM SERPL-SCNC: 4.1 MMOL/L — SIGNIFICANT CHANGE UP (ref 3.5–5.3)
PROT SERPL-MCNC: 7.5 GM/DL — SIGNIFICANT CHANGE UP (ref 6–8.3)
PROTHROM AB SERPL-ACNC: 11.2 SEC — SIGNIFICANT CHANGE UP (ref 10.5–13.4)
RBC # BLD: 4.48 M/UL — SIGNIFICANT CHANGE UP (ref 3.8–5.2)
RBC # FLD: 12.9 % — SIGNIFICANT CHANGE UP (ref 10.3–14.5)
SARS-COV-2 RNA SPEC QL NAA+PROBE: SIGNIFICANT CHANGE UP
SODIUM SERPL-SCNC: 138 MMOL/L — SIGNIFICANT CHANGE UP (ref 135–145)
URATE SERPL-MCNC: 3.9 MG/DL — SIGNIFICANT CHANGE UP (ref 2.5–7)
WBC # BLD: 12.61 K/UL — HIGH (ref 3.8–10.5)
WBC # FLD AUTO: 12.61 K/UL — HIGH (ref 3.8–10.5)

## 2023-03-28 PROCEDURE — 80053 COMPREHEN METABOLIC PANEL: CPT

## 2023-03-28 PROCEDURE — 85027 COMPLETE CBC AUTOMATED: CPT

## 2023-03-28 PROCEDURE — 83735 ASSAY OF MAGNESIUM: CPT

## 2023-03-28 PROCEDURE — 93005 ELECTROCARDIOGRAM TRACING: CPT

## 2023-03-28 PROCEDURE — 85384 FIBRINOGEN ACTIVITY: CPT

## 2023-03-28 PROCEDURE — 87635 SARS-COV-2 COVID-19 AMP PRB: CPT

## 2023-03-28 PROCEDURE — 86769 SARS-COV-2 COVID-19 ANTIBODY: CPT

## 2023-03-28 PROCEDURE — 36415 COLL VENOUS BLD VENIPUNCTURE: CPT

## 2023-03-28 PROCEDURE — C1889: CPT

## 2023-03-28 PROCEDURE — 99214 OFFICE O/P EST MOD 30 MIN: CPT

## 2023-03-28 PROCEDURE — 86900 BLOOD TYPING SEROLOGIC ABO: CPT

## 2023-03-28 PROCEDURE — 94760 N-INVAS EAR/PLS OXIMETRY 1: CPT

## 2023-03-28 PROCEDURE — 85610 PROTHROMBIN TIME: CPT

## 2023-03-28 PROCEDURE — 86901 BLOOD TYPING SEROLOGIC RH(D): CPT

## 2023-03-28 PROCEDURE — 83615 LACTATE (LD) (LDH) ENZYME: CPT

## 2023-03-28 PROCEDURE — 84550 ASSAY OF BLOOD/URIC ACID: CPT

## 2023-03-28 PROCEDURE — 85025 COMPLETE CBC W/AUTO DIFF WBC: CPT

## 2023-03-28 PROCEDURE — 86850 RBC ANTIBODY SCREEN: CPT

## 2023-03-28 PROCEDURE — 85730 THROMBOPLASTIN TIME PARTIAL: CPT

## 2023-03-28 PROCEDURE — 86780 TREPONEMA PALLIDUM: CPT

## 2023-03-28 PROCEDURE — 59050 FETAL MONITOR W/REPORT: CPT

## 2023-03-28 RX ORDER — OXYTOCIN 10 UNIT/ML
333.33 VIAL (ML) INJECTION
Qty: 20 | Refills: 0 | Status: DISCONTINUED | OUTPATIENT
Start: 2023-03-28 | End: 2023-03-29

## 2023-03-28 RX ORDER — LABETALOL HCL 100 MG
20 TABLET ORAL ONCE
Refills: 0 | Status: COMPLETED | OUTPATIENT
Start: 2023-03-28 | End: 2023-03-28

## 2023-03-28 RX ORDER — CITRIC ACID/SODIUM CITRATE 300-500 MG
15 SOLUTION, ORAL ORAL EVERY 6 HOURS
Refills: 0 | Status: DISCONTINUED | OUTPATIENT
Start: 2023-03-28 | End: 2023-03-29

## 2023-03-28 RX ORDER — CHLORHEXIDINE GLUCONATE 213 G/1000ML
1 SOLUTION TOPICAL ONCE
Refills: 0 | Status: DISCONTINUED | OUTPATIENT
Start: 2023-03-28 | End: 2023-03-29

## 2023-03-28 RX ORDER — SODIUM CHLORIDE 9 MG/ML
1000 INJECTION, SOLUTION INTRAVENOUS
Refills: 0 | Status: DISCONTINUED | OUTPATIENT
Start: 2023-03-28 | End: 2023-03-29

## 2023-03-28 RX ORDER — MAGNESIUM SULFATE 500 MG/ML
2 VIAL (ML) INJECTION
Qty: 40 | Refills: 0 | Status: DISCONTINUED | OUTPATIENT
Start: 2023-03-28 | End: 2023-03-29

## 2023-03-28 RX ORDER — MAGNESIUM SULFATE 500 MG/ML
4 VIAL (ML) INJECTION ONCE
Refills: 0 | Status: COMPLETED | OUTPATIENT
Start: 2023-03-28 | End: 2023-03-28

## 2023-03-28 RX ADMIN — Medication 300 GRAM(S): at 19:08

## 2023-03-28 RX ADMIN — Medication 20 MILLIGRAM(S): at 18:49

## 2023-03-28 RX ADMIN — Medication 50 GM/HR: at 19:30

## 2023-03-28 NOTE — DOWNTIME INTERRUPTION NOTE - WHICH MANUAL FORMS INITIATED?
MD orders, labs, NST orders.   OB patient, check and call with documentation in CPN   CAme in @ 1215 and D/C @ 9930

## 2023-03-28 NOTE — PATIENT PROFILE OB - FALL HARM RISK - UNIVERSAL INTERVENTIONS
Bed in lowest position, wheels locked, appropriate side rails in place/Call bell, personal items and telephone in reach/Instruct patient to call for assistance before getting out of bed or chair/Non-slip footwear when patient is out of bed/[place] to call system/Physically safe environment - no spills, clutter or unnecessary equipment/Purposeful Proactive Rounding/Room/bathroom lighting operational, light cord in reach

## 2023-03-29 ENCOUNTER — APPOINTMENT (OUTPATIENT)
Dept: ANTEPARTUM | Facility: CLINIC | Age: 28
End: 2023-03-29

## 2023-03-29 DIAGNOSIS — O26.899 OTHER SPECIFIED PREGNANCY RELATED CONDITIONS, UNSPECIFIED TRIMESTER: ICD-10-CM

## 2023-03-29 DIAGNOSIS — Z3A.00 WEEKS OF GESTATION OF PREGNANCY NOT SPECIFIED: ICD-10-CM

## 2023-03-29 LAB
ALBUMIN SERPL ELPH-MCNC: 2.5 G/DL — LOW (ref 3.3–5)
ALP SERPL-CCNC: 124 U/L — HIGH (ref 40–120)
ALT FLD-CCNC: 26 U/L — SIGNIFICANT CHANGE UP (ref 12–78)
ANION GAP SERPL CALC-SCNC: 8 MMOL/L — SIGNIFICANT CHANGE UP (ref 5–17)
APTT BLD: 25.5 SEC — LOW (ref 27.5–35.5)
AST SERPL-CCNC: 29 U/L — SIGNIFICANT CHANGE UP (ref 15–37)
BASOPHILS # BLD AUTO: 0.03 K/UL — SIGNIFICANT CHANGE UP (ref 0–0.2)
BASOPHILS NFR BLD AUTO: 0.2 % — SIGNIFICANT CHANGE UP (ref 0–2)
BILIRUB SERPL-MCNC: 0.4 MG/DL — SIGNIFICANT CHANGE UP (ref 0.2–1.2)
BUN SERPL-MCNC: 7 MG/DL — SIGNIFICANT CHANGE UP (ref 7–23)
CALCIUM SERPL-MCNC: 7.6 MG/DL — LOW (ref 8.5–10.1)
CHLORIDE SERPL-SCNC: 108 MMOL/L — SIGNIFICANT CHANGE UP (ref 96–108)
CO2 SERPL-SCNC: 22 MMOL/L — SIGNIFICANT CHANGE UP (ref 22–31)
COVID-19 SPIKE DOMAIN AB INTERP: POSITIVE
COVID-19 SPIKE DOMAIN ANTIBODY RESULT: >250 U/ML — HIGH
CREAT SERPL-MCNC: 0.57 MG/DL — SIGNIFICANT CHANGE UP (ref 0.5–1.3)
EGFR: 126 ML/MIN/1.73M2 — SIGNIFICANT CHANGE UP
EOSINOPHIL # BLD AUTO: 0 K/UL — SIGNIFICANT CHANGE UP (ref 0–0.5)
EOSINOPHIL NFR BLD AUTO: 0 % — SIGNIFICANT CHANGE UP (ref 0–6)
FIBRINOGEN PPP-MCNC: 768 MG/DL — HIGH (ref 330–520)
GLUCOSE SERPL-MCNC: 85 MG/DL — SIGNIFICANT CHANGE UP (ref 70–99)
HCT VFR BLD CALC: 35.8 % — SIGNIFICANT CHANGE UP (ref 34.5–45)
HGB BLD-MCNC: 11.4 G/DL — LOW (ref 11.5–15.5)
IMM GRANULOCYTES NFR BLD AUTO: 0.9 % — SIGNIFICANT CHANGE UP (ref 0–0.9)
INR BLD: 0.97 RATIO — SIGNIFICANT CHANGE UP (ref 0.88–1.16)
LDH SERPL L TO P-CCNC: 232 U/L — SIGNIFICANT CHANGE UP (ref 84–241)
LYMPHOCYTES # BLD AUTO: 0.85 K/UL — LOW (ref 1–3.3)
LYMPHOCYTES # BLD AUTO: 6.3 % — LOW (ref 13–44)
MAGNESIUM SERPL-MCNC: 4.9 MG/DL — HIGH (ref 1.6–2.6)
MAGNESIUM SERPL-MCNC: 5 MG/DL — HIGH (ref 1.6–2.6)
MAGNESIUM SERPL-MCNC: 5.1 MG/DL — HIGH (ref 1.6–2.6)
MAGNESIUM SERPL-MCNC: 5.4 MG/DL — HIGH (ref 1.6–2.6)
MAGNESIUM SERPL-MCNC: 6.8 MG/DL — HIGH (ref 1.6–2.6)
MCHC RBC-ENTMCNC: 27.5 PG — SIGNIFICANT CHANGE UP (ref 27–34)
MCHC RBC-ENTMCNC: 31.8 GM/DL — LOW (ref 32–36)
MCV RBC AUTO: 86.3 FL — SIGNIFICANT CHANGE UP (ref 80–100)
MONOCYTES # BLD AUTO: 0.91 K/UL — HIGH (ref 0–0.9)
MONOCYTES NFR BLD AUTO: 6.8 % — SIGNIFICANT CHANGE UP (ref 2–14)
NEUTROPHILS # BLD AUTO: 11.51 K/UL — HIGH (ref 1.8–7.4)
NEUTROPHILS NFR BLD AUTO: 85.8 % — HIGH (ref 43–77)
PLATELET # BLD AUTO: 202 K/UL — SIGNIFICANT CHANGE UP (ref 150–400)
POTASSIUM SERPL-MCNC: 4.1 MMOL/L — SIGNIFICANT CHANGE UP (ref 3.5–5.3)
POTASSIUM SERPL-SCNC: 4.1 MMOL/L — SIGNIFICANT CHANGE UP (ref 3.5–5.3)
PROT SERPL-MCNC: 6.8 GM/DL — SIGNIFICANT CHANGE UP (ref 6–8.3)
PROTHROM AB SERPL-ACNC: 11.2 SEC — SIGNIFICANT CHANGE UP (ref 10.5–13.4)
RBC # BLD: 4.15 M/UL — SIGNIFICANT CHANGE UP (ref 3.8–5.2)
RBC # FLD: 13.1 % — SIGNIFICANT CHANGE UP (ref 10.3–14.5)
SARS-COV-2 IGG+IGM SERPL QL IA: >250 U/ML — HIGH
SARS-COV-2 IGG+IGM SERPL QL IA: POSITIVE
SODIUM SERPL-SCNC: 138 MMOL/L — SIGNIFICANT CHANGE UP (ref 135–145)
T PALLIDUM AB TITR SER: NEGATIVE — SIGNIFICANT CHANGE UP
URATE SERPL-MCNC: 5.3 MG/DL — SIGNIFICANT CHANGE UP (ref 2.5–7)
WBC # BLD: 13.42 K/UL — HIGH (ref 3.8–10.5)
WBC # FLD AUTO: 13.42 K/UL — HIGH (ref 3.8–10.5)

## 2023-03-29 PROCEDURE — 59400 OBSTETRICAL CARE: CPT

## 2023-03-29 RX ORDER — AER TRAVELER 0.5 G/1
1 SOLUTION RECTAL; TOPICAL EVERY 4 HOURS
Refills: 0 | Status: DISCONTINUED | OUTPATIENT
Start: 2023-03-29 | End: 2023-04-04

## 2023-03-29 RX ORDER — MAGNESIUM HYDROXIDE 400 MG/1
30 TABLET, CHEWABLE ORAL
Refills: 0 | Status: DISCONTINUED | OUTPATIENT
Start: 2023-03-29 | End: 2023-04-04

## 2023-03-29 RX ORDER — OXYCODONE HYDROCHLORIDE 5 MG/1
5 TABLET ORAL ONCE
Refills: 0 | Status: DISCONTINUED | OUTPATIENT
Start: 2023-03-29 | End: 2023-04-04

## 2023-03-29 RX ORDER — IBUPROFEN 200 MG
600 TABLET ORAL EVERY 6 HOURS
Refills: 0 | Status: COMPLETED | OUTPATIENT
Start: 2023-03-29 | End: 2024-02-25

## 2023-03-29 RX ORDER — BENZOCAINE 10 %
1 GEL (GRAM) MUCOUS MEMBRANE EVERY 6 HOURS
Refills: 0 | Status: DISCONTINUED | OUTPATIENT
Start: 2023-03-29 | End: 2023-04-04

## 2023-03-29 RX ORDER — SODIUM CHLORIDE 9 MG/ML
1000 INJECTION, SOLUTION INTRAVENOUS
Refills: 0 | Status: DISCONTINUED | OUTPATIENT
Start: 2023-03-29 | End: 2023-04-04

## 2023-03-29 RX ORDER — OXYTOCIN 10 UNIT/ML
41.67 VIAL (ML) INJECTION
Qty: 20 | Refills: 0 | Status: DISCONTINUED | OUTPATIENT
Start: 2023-03-29 | End: 2023-04-04

## 2023-03-29 RX ORDER — SODIUM CHLORIDE 9 MG/ML
3 INJECTION INTRAMUSCULAR; INTRAVENOUS; SUBCUTANEOUS EVERY 8 HOURS
Refills: 0 | Status: DISCONTINUED | OUTPATIENT
Start: 2023-03-29 | End: 2023-04-04

## 2023-03-29 RX ORDER — ONDANSETRON 8 MG/1
4 TABLET, FILM COATED ORAL ONCE
Refills: 0 | Status: COMPLETED | OUTPATIENT
Start: 2023-03-29 | End: 2023-03-29

## 2023-03-29 RX ORDER — DIPHENHYDRAMINE HCL 50 MG
25 CAPSULE ORAL EVERY 6 HOURS
Refills: 0 | Status: DISCONTINUED | OUTPATIENT
Start: 2023-03-29 | End: 2023-04-04

## 2023-03-29 RX ORDER — KETOROLAC TROMETHAMINE 30 MG/ML
30 SYRINGE (ML) INJECTION ONCE
Refills: 0 | Status: DISCONTINUED | OUTPATIENT
Start: 2023-03-29 | End: 2023-03-30

## 2023-03-29 RX ORDER — OXYTOCIN 10 UNIT/ML
2 VIAL (ML) INJECTION
Qty: 30 | Refills: 0 | Status: DISCONTINUED | OUTPATIENT
Start: 2023-03-29 | End: 2023-04-04

## 2023-03-29 RX ORDER — HYDROCORTISONE 1 %
1 OINTMENT (GRAM) TOPICAL EVERY 6 HOURS
Refills: 0 | Status: DISCONTINUED | OUTPATIENT
Start: 2023-03-29 | End: 2023-04-04

## 2023-03-29 RX ORDER — LABETALOL HCL 100 MG
200 TABLET ORAL
Refills: 0 | Status: DISCONTINUED | OUTPATIENT
Start: 2023-03-29 | End: 2023-03-31

## 2023-03-29 RX ORDER — OXYCODONE HYDROCHLORIDE 5 MG/1
5 TABLET ORAL
Refills: 0 | Status: DISCONTINUED | OUTPATIENT
Start: 2023-03-29 | End: 2023-04-04

## 2023-03-29 RX ORDER — LANOLIN
1 OINTMENT (GRAM) TOPICAL EVERY 6 HOURS
Refills: 0 | Status: DISCONTINUED | OUTPATIENT
Start: 2023-03-29 | End: 2023-04-04

## 2023-03-29 RX ORDER — MAGNESIUM SULFATE 500 MG/ML
1 VIAL (ML) INJECTION
Qty: 40 | Refills: 0 | Status: DISCONTINUED | OUTPATIENT
Start: 2023-03-29 | End: 2023-03-30

## 2023-03-29 RX ORDER — DIBUCAINE 1 %
1 OINTMENT (GRAM) RECTAL EVERY 6 HOURS
Refills: 0 | Status: DISCONTINUED | OUTPATIENT
Start: 2023-03-29 | End: 2023-04-04

## 2023-03-29 RX ORDER — TETANUS TOXOID, REDUCED DIPHTHERIA TOXOID AND ACELLULAR PERTUSSIS VACCINE, ADSORBED 5; 2.5; 8; 8; 2.5 [IU]/.5ML; [IU]/.5ML; UG/.5ML; UG/.5ML; UG/.5ML
0.5 SUSPENSION INTRAMUSCULAR ONCE
Refills: 0 | Status: DISCONTINUED | OUTPATIENT
Start: 2023-03-29 | End: 2023-04-04

## 2023-03-29 RX ORDER — ACETAMINOPHEN 500 MG
975 TABLET ORAL
Refills: 0 | Status: DISCONTINUED | OUTPATIENT
Start: 2023-03-29 | End: 2023-04-04

## 2023-03-29 RX ORDER — PRAMOXINE HYDROCHLORIDE 150 MG/15G
1 AEROSOL, FOAM RECTAL EVERY 4 HOURS
Refills: 0 | Status: DISCONTINUED | OUTPATIENT
Start: 2023-03-29 | End: 2023-04-04

## 2023-03-29 RX ORDER — SIMETHICONE 80 MG/1
80 TABLET, CHEWABLE ORAL EVERY 4 HOURS
Refills: 0 | Status: DISCONTINUED | OUTPATIENT
Start: 2023-03-29 | End: 2023-04-04

## 2023-03-29 RX ADMIN — Medication 200 MILLIGRAM(S): at 18:35

## 2023-03-29 RX ADMIN — Medication 200 MILLIGRAM(S): at 06:33

## 2023-03-29 RX ADMIN — ONDANSETRON 4 MILLIGRAM(S): 8 TABLET, FILM COATED ORAL at 09:17

## 2023-03-29 RX ADMIN — Medication 2 MILLIUNIT(S)/MIN: at 17:59

## 2023-03-30 LAB
ALBUMIN SERPL ELPH-MCNC: 1.9 G/DL — LOW (ref 3.3–5)
ALP SERPL-CCNC: 95 U/L — SIGNIFICANT CHANGE UP (ref 40–120)
ALT FLD-CCNC: 18 U/L — SIGNIFICANT CHANGE UP (ref 12–78)
ANION GAP SERPL CALC-SCNC: 6 MMOL/L — SIGNIFICANT CHANGE UP (ref 5–17)
AST SERPL-CCNC: 22 U/L — SIGNIFICANT CHANGE UP (ref 15–37)
BILIRUB SERPL-MCNC: 0.3 MG/DL — SIGNIFICANT CHANGE UP (ref 0.2–1.2)
BUN SERPL-MCNC: 11 MG/DL — SIGNIFICANT CHANGE UP (ref 7–23)
CALCIUM SERPL-MCNC: 7.2 MG/DL — LOW (ref 8.5–10.1)
CHLORIDE SERPL-SCNC: 106 MMOL/L — SIGNIFICANT CHANGE UP (ref 96–108)
CO2 SERPL-SCNC: 22 MMOL/L — SIGNIFICANT CHANGE UP (ref 22–31)
CREAT SERPL-MCNC: 0.57 MG/DL — SIGNIFICANT CHANGE UP (ref 0.5–1.3)
EGFR: 126 ML/MIN/1.73M2 — SIGNIFICANT CHANGE UP
GLUCOSE SERPL-MCNC: 132 MG/DL — HIGH (ref 70–99)
HCT VFR BLD CALC: 27.4 % — LOW (ref 34.5–45)
HGB BLD-MCNC: 9.1 G/DL — LOW (ref 11.5–15.5)
MAGNESIUM SERPL-MCNC: 3.9 MG/DL — HIGH (ref 1.6–2.6)
MAGNESIUM SERPL-MCNC: 4.5 MG/DL — HIGH (ref 1.6–2.6)
MAGNESIUM SERPL-MCNC: 4.6 MG/DL — HIGH (ref 1.6–2.6)
MCHC RBC-ENTMCNC: 28.3 PG — SIGNIFICANT CHANGE UP (ref 27–34)
MCHC RBC-ENTMCNC: 33.2 GM/DL — SIGNIFICANT CHANGE UP (ref 32–36)
MCV RBC AUTO: 85.1 FL — SIGNIFICANT CHANGE UP (ref 80–100)
PLATELET # BLD AUTO: 203 K/UL — SIGNIFICANT CHANGE UP (ref 150–400)
POTASSIUM SERPL-MCNC: 3.7 MMOL/L — SIGNIFICANT CHANGE UP (ref 3.5–5.3)
POTASSIUM SERPL-SCNC: 3.7 MMOL/L — SIGNIFICANT CHANGE UP (ref 3.5–5.3)
PROT SERPL-MCNC: 5.4 GM/DL — LOW (ref 6–8.3)
RBC # BLD: 3.22 M/UL — LOW (ref 3.8–5.2)
RBC # FLD: 13 % — SIGNIFICANT CHANGE UP (ref 10.3–14.5)
SODIUM SERPL-SCNC: 134 MMOL/L — LOW (ref 135–145)
WBC # BLD: 15.14 K/UL — HIGH (ref 3.8–10.5)
WBC # FLD AUTO: 15.14 K/UL — HIGH (ref 3.8–10.5)

## 2023-03-30 RX ORDER — MAGNESIUM SULFATE 500 MG/ML
1 VIAL (ML) INJECTION
Qty: 40 | Refills: 0 | Status: DISCONTINUED | OUTPATIENT
Start: 2023-03-30 | End: 2023-04-04

## 2023-03-30 RX ORDER — IBUPROFEN 200 MG
600 TABLET ORAL EVERY 6 HOURS
Refills: 0 | Status: DISCONTINUED | OUTPATIENT
Start: 2023-03-30 | End: 2023-04-04

## 2023-03-30 RX ADMIN — Medication 600 MILLIGRAM(S): at 06:11

## 2023-03-30 RX ADMIN — Medication 975 MILLIGRAM(S): at 23:00

## 2023-03-30 RX ADMIN — Medication 600 MILLIGRAM(S): at 07:00

## 2023-03-30 RX ADMIN — Medication 30 MILLIGRAM(S): at 01:23

## 2023-03-30 RX ADMIN — Medication 975 MILLIGRAM(S): at 15:25

## 2023-03-30 RX ADMIN — Medication 975 MILLIGRAM(S): at 09:19

## 2023-03-30 RX ADMIN — Medication 600 MILLIGRAM(S): at 13:32

## 2023-03-30 RX ADMIN — Medication 200 MILLIGRAM(S): at 17:46

## 2023-03-30 RX ADMIN — SODIUM CHLORIDE 3 MILLILITER(S): 9 INJECTION INTRAMUSCULAR; INTRAVENOUS; SUBCUTANEOUS at 17:49

## 2023-03-30 RX ADMIN — Medication 600 MILLIGRAM(S): at 17:46

## 2023-03-30 RX ADMIN — Medication 600 MILLIGRAM(S): at 17:49

## 2023-03-30 RX ADMIN — Medication 30 MILLIGRAM(S): at 02:00

## 2023-03-30 RX ADMIN — Medication 1 APPLICATION(S): at 06:12

## 2023-03-30 RX ADMIN — Medication 1 TABLET(S): at 09:19

## 2023-03-30 RX ADMIN — Medication 975 MILLIGRAM(S): at 10:19

## 2023-03-30 RX ADMIN — Medication 200 MILLIGRAM(S): at 06:11

## 2023-03-30 RX ADMIN — Medication 975 MILLIGRAM(S): at 22:29

## 2023-03-30 RX ADMIN — Medication 975 MILLIGRAM(S): at 16:42

## 2023-03-30 RX ADMIN — Medication 600 MILLIGRAM(S): at 12:32

## 2023-03-31 ENCOUNTER — TRANSCRIPTION ENCOUNTER (OUTPATIENT)
Age: 28
End: 2023-03-31

## 2023-03-31 RX ORDER — LABETALOL HCL 100 MG
200 TABLET ORAL
Refills: 0 | Status: DISCONTINUED | OUTPATIENT
Start: 2023-03-31 | End: 2023-04-01

## 2023-03-31 RX ADMIN — Medication 975 MILLIGRAM(S): at 03:40

## 2023-03-31 RX ADMIN — Medication 975 MILLIGRAM(S): at 21:16

## 2023-03-31 RX ADMIN — Medication 600 MILLIGRAM(S): at 00:08

## 2023-03-31 RX ADMIN — Medication 975 MILLIGRAM(S): at 09:22

## 2023-03-31 RX ADMIN — Medication 200 MILLIGRAM(S): at 21:17

## 2023-03-31 RX ADMIN — Medication 600 MILLIGRAM(S): at 17:34

## 2023-03-31 RX ADMIN — Medication 200 MILLIGRAM(S): at 09:20

## 2023-03-31 RX ADMIN — Medication 600 MILLIGRAM(S): at 13:00

## 2023-03-31 RX ADMIN — Medication 600 MILLIGRAM(S): at 12:29

## 2023-03-31 RX ADMIN — Medication 600 MILLIGRAM(S): at 06:49

## 2023-03-31 RX ADMIN — Medication 600 MILLIGRAM(S): at 01:00

## 2023-03-31 RX ADMIN — Medication 975 MILLIGRAM(S): at 04:00

## 2023-03-31 RX ADMIN — Medication 975 MILLIGRAM(S): at 10:22

## 2023-03-31 RX ADMIN — Medication 600 MILLIGRAM(S): at 06:05

## 2023-03-31 RX ADMIN — Medication 600 MILLIGRAM(S): at 17:41

## 2023-03-31 RX ADMIN — Medication 1 TABLET(S): at 09:20

## 2023-03-31 RX ADMIN — Medication 975 MILLIGRAM(S): at 22:00

## 2023-03-31 NOTE — DISCHARGE NOTE OB - MEDICATION SUMMARY - MEDICATIONS TO TAKE
I will START or STAY ON the medications listed below when I get home from the hospital:    Prenatal Multivitamins with Folic Acid 1 mg oral tablet  -- 1 tab(s) by mouth once a day  -- Indication: For Other pregnancy-related conditions, antepartum   I will START or STAY ON the medications listed below when I get home from the hospital:    acetaminophen 325 mg oral tablet  -- 3 tab(s) by mouth every 6 hours  -- Indication: For Pain    ibuprofen 600 mg oral tablet  -- 1 tab(s) by mouth every 6 hours  -- Indication: For Pain    labetalol 200 mg oral tablet  -- 1 tab(s) by mouth every 12 hours  -- Indication: For Preclampsia    Procardia XL 90 mg oral tablet, extended release  -- 1 tab(s) by mouth once a day  -- Indication: For Preeclampsia    Prenatal Multivitamins with Folic Acid 1 mg oral tablet  -- 1 tab(s) by mouth once a day  -- Indication: For Other pregnancy-related conditions, antepartum   I will START or STAY ON the medications listed below when I get home from the hospital:    acetaminophen 325 mg oral tablet  -- 3 tab(s) by mouth every 6 hours  -- Indication: For Pain    ibuprofen 600 mg oral tablet  -- 1 tab(s) by mouth every 6 hours  -- Indication: For Pain    labetalol 200 mg oral tablet  -- 1 tab(s) by mouth 3 times a day  -- Indication: For Preeclampsia    Procardia XL 90 mg oral tablet, extended release  -- 1 tab(s) by mouth once a day  -- Indication: For Preeclampsia    Prenatal Multivitamins with Folic Acid 1 mg oral tablet  -- 1 tab(s) by mouth once a day  -- Indication: For Other pregnancy-related conditions, antepartum

## 2023-03-31 NOTE — DISCHARGE NOTE OB - CARE PROVIDER_API CALL
Rasheed López)  Obstetrics and Gynecology  [address] [place] [id number]  Phone: [phone]  Fax: [phone]  Established Patient  Follow Up Time:

## 2023-03-31 NOTE — DISCHARGE NOTE OB - PATIENT PORTAL LINK FT
You can access the FollowMyHealth Patient Portal offered by [facility] by registering at the following website: [email or web address]. By joining [employer]’s FollowMyHealth portal, you will also be able to view your health information using other applications (apps) compatible with our system.

## 2023-03-31 NOTE — DISCHARGE NOTE OB - PLAN OF CARE
routine post partum instructions and precautions reviewed 1) Take your blood pressure medications as prescribed at home  2) Before taking your medication, take your blood pressure. If your systolic (top number) blood pressure is less than 110 or your heartrate is less than 60, skip that dose  3) Make a follow up appointment with your doctor within a week for a blood pressure check  4) Come back to labor and delivery if you experience a headache that is not relieved with pain medication, changes in your vision, severe abdominal pain in the upper right part of the belly 1) Please take ibuprofen and/or Tylenol as needed for pain as prescribed.  2) Nothing in the vagina for 6 weeks (including no sex, no tampons, and no douching).  3) Please call your doctor for a follow up your postpartum appointment in 4 weeks.  4) Please continue taking vitamins postpartum. Take iron and colace for acute blood loss anemia.  5) Please call the office sooner if you have heavy vaginal bleeding, severe abdominal pain, or fever > 100.4F.  6) You may resume regular daily activity as tolerated 1) Please take your blood pressure medication as prescribed (Procardia 90mg daily, labetalol 200mg every 12 hours).  2) Please monitor BP at home twice daily and call your doctor if >140/90.  3) Please make an appointment with your doctor in 1 week for a blood pressure check and follow up with CardioOB.  4) Please call your doctor sooner if you start experiencing headaches, visual changes, abdominal pain, and/or new onset swelling.

## 2023-03-31 NOTE — DISCHARGE NOTE OB - HOSPITAL COURSE
preeclampsia tx'd with mgso4 Patient underwent a normal spontaneous vaginal delivery complicated by preeclampsia, s/p magnesium treatment and started on PO anti-HTN regimen. Pain is well controlled with PRN medication. She has no difficulty with ambulation, voiding, or PO intake. Lab values and vital signs are within normal limits prior to discharge.

## 2023-03-31 NOTE — DISCHARGE NOTE OB - CARE PLAN
Assessment and plan of treatment:	routine post partum instructions and precautions reviewed   Assessment and plan of treatment:	routine post partum instructions and precautions reviewed  Assessment and plan of treatment:	1) Take your blood pressure medications as prescribed at home  2) Before taking your medication, take your blood pressure. If your systolic (top number) blood pressure is less than 110 or your heartrate is less than 60, skip that dose  3) Make a follow up appointment with your doctor within a week for a blood pressure check  4) Come back to labor and delivery if you experience a headache that is not relieved with pain medication, changes in your vision, severe abdominal pain in the upper right part of the belly   1 Principal Discharge DX:	Delivery normal  Assessment and plan of treatment:	1) Please take ibuprofen and/or Tylenol as needed for pain as prescribed.  2) Nothing in the vagina for 6 weeks (including no sex, no tampons, and no douching).  3) Please call your doctor for a follow up your postpartum appointment in 4 weeks.  4) Please continue taking vitamins postpartum. Take iron and colace for acute blood loss anemia.  5) Please call the office sooner if you have heavy vaginal bleeding, severe abdominal pain, or fever > 100.4F.  6) You may resume regular daily activity as tolerated  Secondary Diagnosis:	Preeclampsia  Assessment and plan of treatment:	1) Please take your blood pressure medication as prescribed (Procardia 90mg daily, labetalol 200mg every 12 hours).  2) Please monitor BP at home twice daily and call your doctor if >140/90.  3) Please make an appointment with your doctor in 1 week for a blood pressure check and follow up with CardioOB.  4) Please call your doctor sooner if you start experiencing headaches, visual changes, abdominal pain, and/or new onset swelling.

## 2023-04-01 RX ORDER — LABETALOL HCL 100 MG
1 TABLET ORAL
Qty: 60 | Refills: 0
Start: 2023-04-01 | End: 2023-04-30

## 2023-04-01 RX ORDER — NIFEDIPINE 30 MG
30 TABLET, EXTENDED RELEASE 24 HR ORAL EVERY 24 HOURS
Refills: 0 | Status: DISCONTINUED | OUTPATIENT
Start: 2023-04-01 | End: 2023-04-01

## 2023-04-01 RX ORDER — LABETALOL HCL 100 MG
200 TABLET ORAL THREE TIMES A DAY
Refills: 0 | Status: DISCONTINUED | OUTPATIENT
Start: 2023-04-01 | End: 2023-04-01

## 2023-04-01 RX ORDER — NIFEDIPINE 30 MG
30 TABLET, EXTENDED RELEASE 24 HR ORAL ONCE
Refills: 0 | Status: COMPLETED | OUTPATIENT
Start: 2023-04-01 | End: 2023-04-01

## 2023-04-01 RX ORDER — NIFEDIPINE 30 MG
60 TABLET, EXTENDED RELEASE 24 HR ORAL EVERY 24 HOURS
Refills: 0 | Status: DISCONTINUED | OUTPATIENT
Start: 2023-04-02 | End: 2023-04-02

## 2023-04-01 RX ADMIN — SODIUM CHLORIDE 3 MILLILITER(S): 9 INJECTION INTRAMUSCULAR; INTRAVENOUS; SUBCUTANEOUS at 22:56

## 2023-04-01 RX ADMIN — SODIUM CHLORIDE 3 MILLILITER(S): 9 INJECTION INTRAMUSCULAR; INTRAVENOUS; SUBCUTANEOUS at 22:00

## 2023-04-01 RX ADMIN — Medication 600 MILLIGRAM(S): at 06:59

## 2023-04-01 RX ADMIN — Medication 600 MILLIGRAM(S): at 00:45

## 2023-04-01 RX ADMIN — SODIUM CHLORIDE 3 MILLILITER(S): 9 INJECTION INTRAMUSCULAR; INTRAVENOUS; SUBCUTANEOUS at 17:17

## 2023-04-01 RX ADMIN — Medication 600 MILLIGRAM(S): at 06:33

## 2023-04-01 RX ADMIN — Medication 600 MILLIGRAM(S): at 18:01

## 2023-04-01 RX ADMIN — Medication 200 MILLIGRAM(S): at 07:41

## 2023-04-01 RX ADMIN — Medication 975 MILLIGRAM(S): at 10:30

## 2023-04-01 RX ADMIN — Medication 30 MILLIGRAM(S): at 18:58

## 2023-04-01 RX ADMIN — Medication 1 TABLET(S): at 09:59

## 2023-04-01 RX ADMIN — Medication 975 MILLIGRAM(S): at 09:59

## 2023-04-01 RX ADMIN — Medication 600 MILLIGRAM(S): at 17:31

## 2023-04-01 RX ADMIN — Medication 30 MILLIGRAM(S): at 10:46

## 2023-04-01 RX ADMIN — SODIUM CHLORIDE 3 MILLILITER(S): 9 INJECTION INTRAMUSCULAR; INTRAVENOUS; SUBCUTANEOUS at 06:00

## 2023-04-01 RX ADMIN — Medication 200 MILLIGRAM(S): at 06:39

## 2023-04-01 RX ADMIN — Medication 600 MILLIGRAM(S): at 01:15

## 2023-04-01 NOTE — PROGRESS NOTE ADULT - ASSESSMENT
A/P:    27y P1now PPD#3 s/p     PECwSF s/p Mag X 24 hrs was on labetalol 200mg TID  PIH labs relevant for P/C 0.5, s/p IVP 20 labetalol, BP still 140-150s/90s, labetalol recently increased to TID, will place on nifedipine 30 XL instead, no PEC symptoms    -D/C labetalol and start on Nifedipine 30 XL, will titrate as needed  -Voiding, tolerating PO, bowel function nml   -Advance care as tolerated   -Continue routine postpartum care and education  -Healthy female infant  - DVT ppx: Ambulation encouraged. SCDs while in bed.   -Dispo: likely discharge home tomorrow once BP are more stable, patient aware she needs to follow up with cardio OB in 1-2 weeks and with OB for BP check in one week from discharge.

## 2023-04-02 LAB
ALBUMIN SERPL ELPH-MCNC: 2.5 G/DL — LOW (ref 3.3–5)
ALP SERPL-CCNC: 108 U/L — SIGNIFICANT CHANGE UP (ref 40–120)
ALT FLD-CCNC: 97 U/L — HIGH (ref 12–78)
ANION GAP SERPL CALC-SCNC: 4 MMOL/L — LOW (ref 5–17)
AST SERPL-CCNC: 86 U/L — HIGH (ref 15–37)
BASOPHILS # BLD AUTO: 0 K/UL — SIGNIFICANT CHANGE UP (ref 0–0.2)
BASOPHILS NFR BLD AUTO: 0 % — SIGNIFICANT CHANGE UP (ref 0–2)
BILIRUB SERPL-MCNC: 0.3 MG/DL — SIGNIFICANT CHANGE UP (ref 0.2–1.2)
BUN SERPL-MCNC: 7 MG/DL — SIGNIFICANT CHANGE UP (ref 7–23)
CALCIUM SERPL-MCNC: 9.4 MG/DL — SIGNIFICANT CHANGE UP (ref 8.5–10.1)
CHLORIDE SERPL-SCNC: 108 MMOL/L — SIGNIFICANT CHANGE UP (ref 96–108)
CO2 SERPL-SCNC: 27 MMOL/L — SIGNIFICANT CHANGE UP (ref 22–31)
CREAT SERPL-MCNC: 0.49 MG/DL — LOW (ref 0.5–1.3)
EGFR: 131 ML/MIN/1.73M2 — SIGNIFICANT CHANGE UP
EOSINOPHIL # BLD AUTO: 0 K/UL — SIGNIFICANT CHANGE UP (ref 0–0.5)
EOSINOPHIL NFR BLD AUTO: 0 % — SIGNIFICANT CHANGE UP (ref 0–6)
GLUCOSE SERPL-MCNC: 78 MG/DL — SIGNIFICANT CHANGE UP (ref 70–99)
HCT VFR BLD CALC: 32.1 % — LOW (ref 34.5–45)
HGB BLD-MCNC: 10.1 G/DL — LOW (ref 11.5–15.5)
LYMPHOCYTES # BLD AUTO: 1.27 K/UL — SIGNIFICANT CHANGE UP (ref 1–3.3)
LYMPHOCYTES # BLD AUTO: 13 % — SIGNIFICANT CHANGE UP (ref 13–44)
MCHC RBC-ENTMCNC: 27.4 PG — SIGNIFICANT CHANGE UP (ref 27–34)
MCHC RBC-ENTMCNC: 31.5 GM/DL — LOW (ref 32–36)
MCV RBC AUTO: 87 FL — SIGNIFICANT CHANGE UP (ref 80–100)
MONOCYTES # BLD AUTO: 0.39 K/UL — SIGNIFICANT CHANGE UP (ref 0–0.9)
MONOCYTES NFR BLD AUTO: 4 % — SIGNIFICANT CHANGE UP (ref 2–14)
NEUTROPHILS # BLD AUTO: 8.03 K/UL — HIGH (ref 1.8–7.4)
NEUTROPHILS NFR BLD AUTO: 82 % — HIGH (ref 43–77)
NRBC # BLD: SIGNIFICANT CHANGE UP /100 WBCS (ref 0–0)
PLATELET # BLD AUTO: 277 K/UL — SIGNIFICANT CHANGE UP (ref 150–400)
POTASSIUM SERPL-MCNC: 3.9 MMOL/L — SIGNIFICANT CHANGE UP (ref 3.5–5.3)
POTASSIUM SERPL-SCNC: 3.9 MMOL/L — SIGNIFICANT CHANGE UP (ref 3.5–5.3)
PROT SERPL-MCNC: 7.1 GM/DL — SIGNIFICANT CHANGE UP (ref 6–8.3)
RBC # BLD: 3.69 M/UL — LOW (ref 3.8–5.2)
RBC # FLD: 13.2 % — SIGNIFICANT CHANGE UP (ref 10.3–14.5)
SODIUM SERPL-SCNC: 139 MMOL/L — SIGNIFICANT CHANGE UP (ref 135–145)
WBC # BLD: 9.79 K/UL — SIGNIFICANT CHANGE UP (ref 3.8–10.5)
WBC # FLD AUTO: 9.79 K/UL — SIGNIFICANT CHANGE UP (ref 3.8–10.5)

## 2023-04-02 RX ORDER — LABETALOL HCL 100 MG
1 TABLET ORAL
Qty: 42 | Refills: 0
Start: 2023-04-02 | End: 2023-04-22

## 2023-04-02 RX ORDER — LABETALOL HCL 100 MG
200 TABLET ORAL EVERY 8 HOURS
Refills: 0 | Status: DISCONTINUED | OUTPATIENT
Start: 2023-04-02 | End: 2023-04-02

## 2023-04-02 RX ORDER — NIFEDIPINE 30 MG
90 TABLET, EXTENDED RELEASE 24 HR ORAL EVERY 24 HOURS
Refills: 0 | Status: DISCONTINUED | OUTPATIENT
Start: 2023-04-02 | End: 2023-04-04

## 2023-04-02 RX ORDER — NIFEDIPINE 30 MG
1 TABLET, EXTENDED RELEASE 24 HR ORAL
Qty: 0 | Refills: 0
Start: 2023-04-02

## 2023-04-02 RX ORDER — LABETALOL HCL 100 MG
200 TABLET ORAL EVERY 8 HOURS
Refills: 0 | Status: DISCONTINUED | OUTPATIENT
Start: 2023-04-02 | End: 2023-04-04

## 2023-04-02 RX ORDER — NIFEDIPINE 30 MG
30 TABLET, EXTENDED RELEASE 24 HR ORAL ONCE
Refills: 0 | Status: COMPLETED | OUTPATIENT
Start: 2023-04-02 | End: 2023-04-02

## 2023-04-02 RX ADMIN — Medication 1 TABLET(S): at 09:29

## 2023-04-02 RX ADMIN — Medication 30 MILLIGRAM(S): at 00:32

## 2023-04-02 RX ADMIN — Medication 200 MILLIGRAM(S): at 13:18

## 2023-04-02 RX ADMIN — Medication 975 MILLIGRAM(S): at 09:29

## 2023-04-02 RX ADMIN — Medication 600 MILLIGRAM(S): at 07:23

## 2023-04-02 RX ADMIN — Medication 90 MILLIGRAM(S): at 08:51

## 2023-04-02 RX ADMIN — SODIUM CHLORIDE 3 MILLILITER(S): 9 INJECTION INTRAMUSCULAR; INTRAVENOUS; SUBCUTANEOUS at 21:56

## 2023-04-02 RX ADMIN — Medication 975 MILLIGRAM(S): at 22:45

## 2023-04-02 RX ADMIN — Medication 600 MILLIGRAM(S): at 06:44

## 2023-04-02 RX ADMIN — Medication 975 MILLIGRAM(S): at 21:52

## 2023-04-02 RX ADMIN — Medication 200 MILLIGRAM(S): at 21:53

## 2023-04-02 RX ADMIN — Medication 975 MILLIGRAM(S): at 00:32

## 2023-04-02 RX ADMIN — Medication 975 MILLIGRAM(S): at 01:30

## 2023-04-02 RX ADMIN — Medication 975 MILLIGRAM(S): at 09:59

## 2023-04-02 NOTE — PROGRESS NOTE ADULT - ASSESSMENT
ASSESSMENT  CYRUS FRITZ is a 27y  who is post-partum day#4  who delivered at 36w6d GA by vaginal delivery Peripartum course complicated by pre-eclampsia with severe features on Magnesium Sulfate therapy. She is currently on procardia 90 mg for BP control and s/p MgSO4 therapy.   No acute events.     PLAN  1. Pre-eclampsia with severe features  - currently on procardia 90 mg xl  - will continue to monitor BPs during the day and consider d/c per attending discretion  - will increase medication to labetalol 200mg every 8 hours    2. Routine post-partum care  - Continue to encourage ambulation, PO intake and breastfeeding  - DVT prophylaxis SCDs and ambulation  - Continue pain management PRN.   - Plan to discharge per attending approval

## 2023-04-03 LAB
ALBUMIN SERPL ELPH-MCNC: 2.6 G/DL — LOW (ref 3.3–5)
ALP SERPL-CCNC: 101 U/L — SIGNIFICANT CHANGE UP (ref 40–120)
ALT FLD-CCNC: 88 U/L — HIGH (ref 12–78)
ANION GAP SERPL CALC-SCNC: 6 MMOL/L — SIGNIFICANT CHANGE UP (ref 5–17)
AST SERPL-CCNC: 48 U/L — HIGH (ref 15–37)
BASOPHILS # BLD AUTO: 0.03 K/UL
BASOPHILS NFR BLD AUTO: 0.3 %
BILIRUB SERPL-MCNC: 0.2 MG/DL — SIGNIFICANT CHANGE UP (ref 0.2–1.2)
BUN SERPL-MCNC: 14 MG/DL — SIGNIFICANT CHANGE UP (ref 7–23)
CALCIUM SERPL-MCNC: 9 MG/DL — SIGNIFICANT CHANGE UP (ref 8.5–10.1)
CHLORIDE SERPL-SCNC: 111 MMOL/L — HIGH (ref 96–108)
CO2 SERPL-SCNC: 24 MMOL/L — SIGNIFICANT CHANGE UP (ref 22–31)
CREAT SERPL-MCNC: 0.54 MG/DL — SIGNIFICANT CHANGE UP (ref 0.5–1.3)
EGFR: 128 ML/MIN/1.73M2 — SIGNIFICANT CHANGE UP
EOSINOPHIL # BLD AUTO: 0.09 K/UL
EOSINOPHIL NFR BLD AUTO: 0.8 %
GLUCOSE SERPL-MCNC: 111 MG/DL — HIGH (ref 70–99)
GP B STREP DNA SPEC QL NAA+PROBE: NOT DETECTED
HCT VFR BLD CALC: 32.7 % — LOW (ref 34.5–45)
HCT VFR BLD CALC: 38.1 %
HGB BLD-MCNC: 10.3 G/DL — LOW (ref 11.5–15.5)
HGB BLD-MCNC: 11.8 G/DL
HIV1+2 AB SPEC QL IA.RAPID: NONREACTIVE
IMM GRANULOCYTES NFR BLD AUTO: 1.4 %
LYMPHOCYTES # BLD AUTO: 1.48 K/UL
LYMPHOCYTES NFR BLD AUTO: 13.7 %
MAN DIFF?: NORMAL
MCHC RBC-ENTMCNC: 27.6 PG
MCHC RBC-ENTMCNC: 27.6 PG — SIGNIFICANT CHANGE UP (ref 27–34)
MCHC RBC-ENTMCNC: 31 GM/DL
MCHC RBC-ENTMCNC: 31.5 GM/DL — LOW (ref 32–36)
MCV RBC AUTO: 87.7 FL — SIGNIFICANT CHANGE UP (ref 80–100)
MCV RBC AUTO: 89 FL
MONOCYTES # BLD AUTO: 0.77 K/UL
MONOCYTES NFR BLD AUTO: 7.1 %
NEUTROPHILS # BLD AUTO: 8.26 K/UL
NEUTROPHILS NFR BLD AUTO: 76.7 %
PLATELET # BLD AUTO: 236 K/UL
PLATELET # BLD AUTO: 277 K/UL — SIGNIFICANT CHANGE UP (ref 150–400)
POTASSIUM SERPL-MCNC: 4.4 MMOL/L — SIGNIFICANT CHANGE UP (ref 3.5–5.3)
POTASSIUM SERPL-SCNC: 4.4 MMOL/L — SIGNIFICANT CHANGE UP (ref 3.5–5.3)
PROT SERPL-MCNC: 7 GM/DL — SIGNIFICANT CHANGE UP (ref 6–8.3)
RBC # BLD: 3.73 M/UL — LOW (ref 3.8–5.2)
RBC # BLD: 4.28 M/UL
RBC # FLD: 13.4 %
RBC # FLD: 13.5 % — SIGNIFICANT CHANGE UP (ref 10.3–14.5)
SODIUM SERPL-SCNC: 141 MMOL/L — SIGNIFICANT CHANGE UP (ref 135–145)
SOURCE GBS: NORMAL
WBC # BLD: 9.84 K/UL — SIGNIFICANT CHANGE UP (ref 3.8–10.5)
WBC # FLD AUTO: 10.78 K/UL
WBC # FLD AUTO: 9.84 K/UL — SIGNIFICANT CHANGE UP (ref 3.8–10.5)

## 2023-04-03 PROCEDURE — 93010 ELECTROCARDIOGRAM REPORT: CPT

## 2023-04-03 RX ORDER — ACETAMINOPHEN 500 MG
3 TABLET ORAL
Qty: 0 | Refills: 0 | DISCHARGE
Start: 2023-04-03

## 2023-04-03 RX ORDER — NIFEDIPINE 30 MG
1 TABLET, EXTENDED RELEASE 24 HR ORAL
Qty: 21 | Refills: 0
Start: 2023-04-03 | End: 2023-04-23

## 2023-04-03 RX ORDER — IBUPROFEN 200 MG
1 TABLET ORAL
Qty: 0 | Refills: 0 | DISCHARGE
Start: 2023-04-03

## 2023-04-03 RX ADMIN — Medication 1 TABLET(S): at 12:10

## 2023-04-03 RX ADMIN — Medication 90 MILLIGRAM(S): at 12:10

## 2023-04-03 RX ADMIN — Medication 600 MILLIGRAM(S): at 12:10

## 2023-04-03 RX ADMIN — Medication 975 MILLIGRAM(S): at 04:30

## 2023-04-03 RX ADMIN — Medication 200 MILLIGRAM(S): at 22:28

## 2023-04-03 RX ADMIN — Medication 975 MILLIGRAM(S): at 10:31

## 2023-04-03 RX ADMIN — Medication 975 MILLIGRAM(S): at 03:41

## 2023-04-03 RX ADMIN — Medication 975 MILLIGRAM(S): at 16:42

## 2023-04-03 RX ADMIN — Medication 975 MILLIGRAM(S): at 09:31

## 2023-04-03 RX ADMIN — Medication 600 MILLIGRAM(S): at 06:40

## 2023-04-03 RX ADMIN — Medication 600 MILLIGRAM(S): at 17:36

## 2023-04-03 RX ADMIN — Medication 200 MILLIGRAM(S): at 14:08

## 2023-04-03 RX ADMIN — Medication 200 MILLIGRAM(S): at 06:40

## 2023-04-03 RX ADMIN — Medication 975 MILLIGRAM(S): at 21:45

## 2023-04-03 RX ADMIN — Medication 975 MILLIGRAM(S): at 15:42

## 2023-04-03 RX ADMIN — Medication 600 MILLIGRAM(S): at 07:39

## 2023-04-03 RX ADMIN — Medication 975 MILLIGRAM(S): at 21:06

## 2023-04-03 RX ADMIN — Medication 600 MILLIGRAM(S): at 13:10

## 2023-04-03 NOTE — PROGRESS NOTE ADULT - ASSESSMENT
26yo on PPD#5 complicated by preeclampsia with severe features. Her BPs are now well-controlled on Procardia and labetalol. However, she has persistent tachycardia. Reports that during pregnancy her HR was always elevated. She is asymptomatic. EKG being performed now. Her LFTs were also noted elevated yesterday. Will consider Medicine consult once workup results are resulted. MD1 and team notified to follow up today. Patient aware of plan and agreed.     Plan  - routine pp care  - regular diet  - PEC w severe features-- pending rpt CMP this am; Tx Procardia, labetalol  - tachycardia-- asymptomatic, pending EKG and possible Medicine consult for elevated HR and monitor BPs as well as uptrend in LFTs  - DVT prophylaxis--- OOB  - baby girl-- still in NICU

## 2023-04-04 VITALS — DIASTOLIC BLOOD PRESSURE: 79 MMHG | HEART RATE: 103 BPM | SYSTOLIC BLOOD PRESSURE: 128 MMHG

## 2023-04-04 LAB
ALBUMIN SERPL ELPH-MCNC: 2.6 G/DL — LOW (ref 3.3–5)
ALP SERPL-CCNC: 95 U/L — SIGNIFICANT CHANGE UP (ref 40–120)
ALT FLD-CCNC: 71 U/L — SIGNIFICANT CHANGE UP (ref 12–78)
ANION GAP SERPL CALC-SCNC: 2 MMOL/L — LOW (ref 5–17)
AST SERPL-CCNC: 38 U/L — HIGH (ref 15–37)
BILIRUB SERPL-MCNC: 0.3 MG/DL — SIGNIFICANT CHANGE UP (ref 0.2–1.2)
BUN SERPL-MCNC: 10 MG/DL — SIGNIFICANT CHANGE UP (ref 7–23)
CALCIUM SERPL-MCNC: 8.8 MG/DL — SIGNIFICANT CHANGE UP (ref 8.5–10.1)
CHLORIDE SERPL-SCNC: 111 MMOL/L — HIGH (ref 96–108)
CO2 SERPL-SCNC: 26 MMOL/L — SIGNIFICANT CHANGE UP (ref 22–31)
CREAT SERPL-MCNC: 0.62 MG/DL — SIGNIFICANT CHANGE UP (ref 0.5–1.3)
EGFR: 124 ML/MIN/1.73M2 — SIGNIFICANT CHANGE UP
GLUCOSE SERPL-MCNC: 108 MG/DL — HIGH (ref 70–99)
HCT VFR BLD CALC: 31.6 % — LOW (ref 34.5–45)
HGB BLD-MCNC: 10 G/DL — LOW (ref 11.5–15.5)
MCHC RBC-ENTMCNC: 28.1 PG — SIGNIFICANT CHANGE UP (ref 27–34)
MCHC RBC-ENTMCNC: 31.6 GM/DL — LOW (ref 32–36)
MCV RBC AUTO: 88.8 FL — SIGNIFICANT CHANGE UP (ref 80–100)
PLATELET # BLD AUTO: 273 K/UL — SIGNIFICANT CHANGE UP (ref 150–400)
POTASSIUM SERPL-MCNC: 4.1 MMOL/L — SIGNIFICANT CHANGE UP (ref 3.5–5.3)
POTASSIUM SERPL-SCNC: 4.1 MMOL/L — SIGNIFICANT CHANGE UP (ref 3.5–5.3)
PROT SERPL-MCNC: 7.1 GM/DL — SIGNIFICANT CHANGE UP (ref 6–8.3)
RBC # BLD: 3.56 M/UL — LOW (ref 3.8–5.2)
RBC # FLD: 13.7 % — SIGNIFICANT CHANGE UP (ref 10.3–14.5)
SODIUM SERPL-SCNC: 139 MMOL/L — SIGNIFICANT CHANGE UP (ref 135–145)
WBC # BLD: 10.61 K/UL — HIGH (ref 3.8–10.5)
WBC # FLD AUTO: 10.61 K/UL — HIGH (ref 3.8–10.5)

## 2023-04-04 PROCEDURE — 99252 IP/OBS CONSLTJ NEW/EST SF 35: CPT

## 2023-04-04 RX ORDER — LABETALOL HCL 100 MG
1 TABLET ORAL
Qty: 63 | Refills: 0
Start: 2023-04-04 | End: 2023-04-24

## 2023-04-04 RX ADMIN — Medication 1 TABLET(S): at 10:24

## 2023-04-04 RX ADMIN — Medication 600 MILLIGRAM(S): at 13:00

## 2023-04-04 RX ADMIN — Medication 200 MILLIGRAM(S): at 06:24

## 2023-04-04 RX ADMIN — Medication 600 MILLIGRAM(S): at 12:26

## 2023-04-04 RX ADMIN — Medication 600 MILLIGRAM(S): at 00:04

## 2023-04-04 RX ADMIN — Medication 600 MILLIGRAM(S): at 00:40

## 2023-04-04 RX ADMIN — Medication 975 MILLIGRAM(S): at 15:15

## 2023-04-04 RX ADMIN — Medication 975 MILLIGRAM(S): at 11:00

## 2023-04-04 RX ADMIN — Medication 200 MILLIGRAM(S): at 14:18

## 2023-04-04 RX ADMIN — Medication 975 MILLIGRAM(S): at 10:24

## 2023-04-04 RX ADMIN — Medication 90 MILLIGRAM(S): at 12:25

## 2023-04-04 RX ADMIN — Medication 975 MILLIGRAM(S): at 15:02

## 2023-04-04 NOTE — PROGRESS NOTE ADULT - SUBJECTIVE AND OBJECTIVE BOX
S: Patient doing well. Minimal lochia. No complaints.     O: Vital Signs Last 24 Hrs  T(C): 36.7 (31 Mar 2023 05:45), Max: 36.8 (30 Mar 2023 10:00)  T(F): 98 (31 Mar 2023 05:45), Max: 98.2 (30 Mar 2023 10:00)  HR: 85 (31 Mar 2023 05:45) (81 - 98)  BP: 124/76 (31 Mar 2023 05:45) (106/62 - 124/76)  BP(mean): --  RR: 16 (31 Mar 2023 05:45) (16 - 16)  SpO2: 99% (31 Mar 2023 05:45) (94% - 99%)    Parameters below as of 31 Mar 2023 05:45  Patient On (Oxygen Delivery Method): room air        Gen: NAD  Abd: soft, NT, ND, fundus firm below umbilicus  Ext: no tenderness    Labs:                        9.1    15.14 )-----------( 203      ( 30 Mar 2023 07:56 )             27.4        Rubella status:    A: 27y PPD# 2 s/p      Doing well    Plan:  [ ] Discharge home.  Nothing in vagina and no heavy lifting for 6 weeks.   Follow up 2 and 6 weeks for post partum visit.  Call office for any fevers, chills, heavy vaginal bleeding, symptoms of depression, or any other concerning symptoms.  Continue motrin 600 mg every 6 hours.    
28yo P1 on PPD#5 status post  complicated by preeclampsia with severe features. Denies s/s PEC. Tolerating diet and ambulation without difficulties. Normal lochia.     Vital Signs Last 24 Hrs  T(C): 36.3 (2023 08:14), Max: 36.9 (2023 10:23)  T(F): 97.4 (2023 08:14), Max: 98.4 (2023 10:23)  HR: 114 (2023 08:14) (98 - 114)  BP: 116/68 (2023 08:14) (116/68 - 152/103)  BP(mean): --  RR: 16 (2023 08:14) (16 - 16)  SpO2: 98% (2023 08:14) (98% - 100%)    Parameters below as of 2023 08:14  Patient On (Oxygen Delivery Method): room air      Labs                        10.3   9.84  )-----------( 277      ( 2023 09:26 )             32.7     04-02    139  |  108  |  7   ----------------------------<  78  3.9   |  27  |  0.49<L>    Ca    9.4      2023 13:06    TPro  7.1  /  Alb  2.5<L>  /  TBili  0.3  /  DBili  x   /  AST  86<H>  /  ALT  97<H>  /  AlkPhos  108  04-02    Physical exam  Gen AAO x 3  Abd well-contracted uterus  Pelvic normal lochia  Ext no calf tenderness  
CYRUS FRITZ is a 26yo  now PPD#6 s/p spontaneous vaginal delivery at 36w6d gestation for PEC with SF on Mag, s/p Lab 20 mg IVP, s/p Beta on 3/23 and 3/24.    S:    The patient has no complaints.  Pain controlled with current medications.   Has appropriate amount of vaginal bleeding.  Denies fever, chills, headache, dizziness, CP, SOB, N/V  She is ambulating without difficulty and tolerating PO   + flatus/+BM/+ voiding     O:    T(C): 36.6 (23 @ 08:38), Max: 36.7 (23 @ 02:00)  HR: 93 (23 @ 12:12) (93 - 103)  BP: 126/81 (23 @ 12:12) (115/77 - 135/94)  RR: 18 (23 @ 08:38) (18 - 18)  SpO2: 98% (23 @ 08:38) (98% - 100%)    Gen: NAD, AOx3  CV: RRR  Pulm: CTAB  Breast: nontender, non-engorged   Abdomen:  soft, non-tender, non-distended, +bowel sounds.  Uterus:  Fundus firm below umbilicus  VE:  +lochia  Ext:  Non-tender.                          10.0   10.61 )-----------( 273      ( 2023 08:45 )             31.6         139  |  111<H>  |  10  ----------------------------<  108<H>  4.1   |  26  |  0.62    Ca    8.8      2023 08:45    TPro  7.1  /  Alb  2.6<L>  /  TBili  0.3  /  DBili  x   /  AST  38<H>  /  ALT  71  /  AlkPhos  95          
CYRUS FRITZ is a 27y P1now PPD#3 s/p  @ 36 wks for PECwSF s/p Mag X 24 hrs on labetalol 200mg TID  PIH labs relevant for P/C 0.5, s/p IVP 20 labetalol, BP still 140-150s/90s, labetalol recently increased to TID, will place on nifedipine 30 XL instead, no PEC symptoms    S:    No acute events overnight.   The patient has no complaints.  Pain controlled with current treatment regimen.   She is ambulating without difficulty and tolerating PO.   + flatus/-BM/+ voiding   She endorses appropriate lochia, which is decreasing.   She is breastfeeding without difficulty.   She denies fevers, chills, nausea and vomiting.   She denies HA, vision changes, RUQ/epigastric tenderness, lightheadedness, dizziness, palpitations, chest pain and SOB.     O:    T(C): 36.4 (23 @ 08:32), Max: 36.9 (23 @ 05:30)  HR: 91 (23 @ 08:32) (90 - 111)  BP: 167/95 (23 @ 08:32) (122/68 - 167/95)  RR: 16 (23 @ 08:32) (16 - 17)  SpO2: 98% (23 @ 08:32) (98% - 100%)    Gen: NAD, AOx3  CV: RRR, S1/S2 present  Pulm: CTAB  Abdomen:  Soft, non-tender, non-distended, +bowel sounds  Uterus:  Fundus firm below umbilicus  VE:  Expected lochia  Ext:  Bilateral lower extremities non-tender and non-edematous        Mg     3.9     03-30        
S: Patient doing well. Minimal lochia. No complaints.     O: Vital Signs Last 24 Hrs  T(C): 36.7 (30 Mar 2023 08:00), Max: 37.2 (29 Mar 2023 23:00)  T(F): 98 (30 Mar 2023 08:00), Max: 99 (29 Mar 2023 23:00)  HR: 92 (30 Mar 2023 08:00) (92 - 116)  BP: 103/66 (30 Mar 2023 08:00) (103/66 - 134/82)  BP(mean): --  RR: 16 (30 Mar 2023 08:00) (16 - 18)  SpO2: 96% (30 Mar 2023 08:00) (95% - 96%)    Parameters below as of 30 Mar 2023 08:00  Patient On (Oxygen Delivery Method): room air        Gen: NAD  Abd: soft, NT, ND, fundus firm below umbilicus  Perineum: clean,dry and intact    Ext: no tenderness    Labs:                        9.1    15.14 )-----------( 203      ( 30 Mar 2023 07:56 )             27.4        Rubella status:    A: 27y PPD# 1 s/p      Doing well    Plan: continue progressive  care    \par 
Vaginal Delivery Progress Note    CYRUS FRITZ is a 27y  who is post-partum day#4  who delivered at 36w6d GA by vaginal delivery Peripartum course complicated by pre-eclampsia with severe features on Magnesium Sulfate therapy. She is currently on procardia 90 mg for BP control and s/p MgSO4 therapy.      SUBJECTIVE:  No acute events overnight. Pain is well controlled with PRN pain medication. No problems with ambulating, voiding, or PO intake. Has had flatus but no BM. Denies nausea and vomiting. Patient is having normal lochia, which is decreasing.    She is breastfeeding and the baby is latching on. Denies headache, changes in vision or increased swelling. Has a BP cuff at home     OBJECTIVE:  Physical exam:  Vital Signs Last 24 Hrs  T(C): 36.7 (2023 12:05), Max: 36.9 (2023 10:23)  T(F): 98.1 (2023 12:05), Max: 98.4 (2023 10:23)  HR: 104 (2023 12:05) (91 - 116)  BP: 152/103 (2023 12:05) (139/87 - 155/96)  RR: 16 (2023 12:05) (16 - 16)  SpO2: 98% (2023 12:05) (98% - 100%)    Parameters below as of 2023 12:05  Patient On (Oxygen Delivery Method): room air    General: alert and oriented x3, no acute distress.  Heart: regular rate and rhythm, no murmurs, rubs or gallops appreciated.  Lungs: clear to auscultation bilaterally.   Abdomen: Soft, appropriately tender to palpitation, firm uterine fundus at umbilicus.  Extremities: no tenderness, redness or swelling in lower extremities bilaterally.

## 2023-04-04 NOTE — PROGRESS NOTE ADULT - ASSESSMENT
A/P:  Patient is a 26yo  now PPD#6 s/p spontaneous vaginal delivery at 36w6d gestation for PEC with SF on Mag, s/p Lab 20 mg IVP, s/p Beta on 3/23 and 3/24.    - Blood pressure controlled on current medications  -Vital signs stable  -Postpartum H&H stable  - LFTs downtrending  -Voiding, tolerating PO, bowel function nml   -Continue Procardia 90 mg and Labetalol 200 mg q8 hr  - FU with Cardo appt on   - Medicine consulted, recommend outpatient follow up  - Pt medically stable for discharge home today  - FU with OB in 4-6 weeks A/P:  Patient is a 28yo  now PPD#6 s/p spontaneous vaginal delivery at 36w6d gestation for PEC with SF on Mag, s/p Lab 20 mg IVP, s/p Beta on 3/23 and 3/24.    - Blood pressure controlled on current medications  -Vital signs stable  -Postpartum H&H stable  - LFTs downtrending  -Voiding, tolerating PO, bowel function nml   -Continue Procardia 90 mg and Labetalol 200 mg q8 hr  - FU with Cardo appt on   - Medicine consulted, recommend outpatient follow up  - Pt medically stable for discharge home today  - Pt plans to breast feed  - FU with OB in 4-6 weeks  - Recommend nothing per vagina for 6 weeks A/P:  Patient is a 28yo  now PPD#6 s/p spontaneous vaginal delivery at 36w6d gestation for PEC with SF on Mag, s/p Lab 20 mg IVP, s/p Beta on 3/23 and 3/24.    - Blood pressure controlled on current medications  -Vital signs stable  -Postpartum H&H stable  - LFTs downtrending  -Voiding, tolerating PO, bowel function nml   -Continue Procardia 90 mg and Labetalol 200 mg q8 hr  - FU with Cardo appt on   - Medicine consulted, recommend outpatient follow up  - Pt medically stable for discharge home today  - pt given return precautions  - Pt plans to breast feed  - FU with OB in 4-6 weeks  - Recommend nothing per vagina for 6 weeks

## 2023-04-06 DIAGNOSIS — Z3A.36 36 WEEKS GESTATION OF PREGNANCY: ICD-10-CM

## 2023-04-07 DIAGNOSIS — O14.03 MILD TO MODERATE PRE-ECLAMPSIA, THIRD TRIMESTER: ICD-10-CM

## 2023-04-07 DIAGNOSIS — Z28.09 IMMUNIZATION NOT CARRIED OUT BECAUSE OF OTHER CONTRAINDICATION: ICD-10-CM

## 2023-04-07 DIAGNOSIS — Z3A.36 36 WEEKS GESTATION OF PREGNANCY: ICD-10-CM

## 2023-04-07 DIAGNOSIS — Z71.85 ENCOUNTER FOR IMMUNIZATION SAFETY COUNSELING: ICD-10-CM

## 2023-04-13 ENCOUNTER — APPOINTMENT (OUTPATIENT)
Dept: OBGYN | Facility: CLINIC | Age: 28
End: 2023-04-13
Payer: COMMERCIAL

## 2023-04-13 VITALS — SYSTOLIC BLOOD PRESSURE: 112 MMHG | BODY MASS INDEX: 25.15 KG/M2 | DIASTOLIC BLOOD PRESSURE: 68 MMHG | WEIGHT: 142 LBS

## 2023-04-13 PROCEDURE — 99214 OFFICE O/P EST MOD 30 MIN: CPT | Mod: 25

## 2023-04-13 NOTE — HISTORY OF PRESENT ILLNESS
[Postpartum Follow Up] : postpartum follow up [] : delivered by vaginal delivery [Female] : Delivery History: baby girl [Breastfeeding] : currently nursing [Back to Normal] : is back to normal in size [Mild] : mild vaginal bleeding [Normal] : the vagina was normal [Not Done] : Examination of breasts not done [Doing Well] : is doing well [No Sign of Infection] : is showing no signs of infection [Excellent Pain Control] : has excellent pain control [None] : None [Complications:___] : no complications [S/Sx PP Depression] : no signs/symptoms of postpartum depression [Erythema] : not erythematous [Cervix Sample Taken] : cervical sample not taken for a Pap smear [FreeTextEntry8] : PT ON LABETOLOL AND PROCARDIA. 200MG TID AND PROCARDIA 90 QDAY [de-identified] : FOLLOW UP 4 WEEKS. CARDIOLOGY 4/26 VISIT SCHEDULED

## 2023-04-26 ENCOUNTER — APPOINTMENT (OUTPATIENT)
Dept: CARDIOLOGY | Facility: CLINIC | Age: 28
End: 2023-04-26
Payer: COMMERCIAL

## 2023-04-26 DIAGNOSIS — Z83.3 FAMILY HISTORY OF DIABETES MELLITUS: ICD-10-CM

## 2023-04-26 DIAGNOSIS — Z87.59 PERSONAL HISTORY OF OTHER COMPLICATIONS OF PREGNANCY, CHILDBIRTH AND THE PUERPERIUM: ICD-10-CM

## 2023-04-26 DIAGNOSIS — Z83.438 FAMILY HISTORY OF OTHER DISORDER OF LIPOPROTEIN METABOLISM AND OTHER LIPIDEMIA: ICD-10-CM

## 2023-04-26 DIAGNOSIS — O13.9 GESTATIONAL [PREGNANCY-INDUCED] HYPERTENSION W/OUT SIGNIFICANT PROTEINURIA, UNSPECIFIED TRIMESTER: ICD-10-CM

## 2023-04-26 DIAGNOSIS — Z82.49 FAMILY HISTORY OF ISCHEMIC HEART DISEASE AND OTHER DISEASES OF THE CIRCULATORY SYSTEM: ICD-10-CM

## 2023-04-26 PROCEDURE — 99214 OFFICE O/P EST MOD 30 MIN: CPT | Mod: 95

## 2023-04-27 PROBLEM — O13.9 GESTATIONAL HYPERTENSION: Status: ACTIVE | Noted: 2023-04-27

## 2023-04-27 PROBLEM — Z82.49 FAMILY HISTORY OF HYPERTENSION: Status: ACTIVE | Noted: 2021-12-28

## 2023-04-27 PROBLEM — O13.9 GESTATIONAL HTN: Status: ACTIVE | Noted: 2023-04-26

## 2023-04-27 PROBLEM — Z83.438 FAMILY HISTORY OF HYPERLIPIDEMIA: Status: ACTIVE | Noted: 2023-04-27

## 2023-04-27 PROBLEM — Z83.3 FAMILY HISTORY OF DIABETES MELLITUS: Status: ACTIVE | Noted: 2021-12-28

## 2023-04-27 PROBLEM — Z87.59 HISTORY OF PRE-ECLAMPSIA: Status: RESOLVED | Noted: 2023-04-27 | Resolved: 2023-04-27

## 2023-04-27 NOTE — ASSESSMENT
[FreeTextEntry1] : Ms. Willams is a 27 year old female that presents to [facility] that presents postpartum for blood pressure management.\par \par Patient was induced and delivered a baby girl on 2023,  secondary to the development of preeclampsia and gestational hypertension at 36 weeks gestation.\par Her original due was 2023.\par \par She was treated with IV Magnesium for seizure prophylaxis and discharged to home on Labetalol 200 mg TID and Procardia XL 90 mg QD.\par \par +Family history of hypertension, diabetes and hyperlipidemia\par \par #Gestational hypertension\par   Blood pressure today:   106/72\par   Decision was to down titrated Procardia from 90 mg -> 60 mg QD\par   Continue on Labetalol 200 mg TID\par   Continue with daily monitoring of blood pressures-> patient will send BP log for review and reassess BP control\par \par #Adverse Cardiovascular Risk Factors\par \par Personal history of preeclampsia, and hypertension \par Family history of hypertension, diabetes and hyperlipidemia\par \par Recommending a baseline cardiovascular evaluation 3 months postpartum to assess risk\par In-office physical examination and 12 lead EKG\par Echocardiogram to assess cardiac structure and function\par Exercise stress testing to assess functional status\par Full blood work panel:  CBC,CMP, Hgb A1C, TSH, Lipid profile\par \par - Encouraged patient to participate in  healthy walking and eating habits, focusing on a Mediterranean style of eating and aiming for the recommended 150 minutes per week of moderate physical activity.\par \par - Encouraged the patient to find healthy outlets and coping mechanisms to help manage stress, such as physical activity/exercise, reducing workload if possible, spending time with family and friends, engaging in an enjoyable hobby, or using meditation or mindfulness techniques.\par \par \par \par \par

## 2023-04-27 NOTE — HISTORY OF PRESENT ILLNESS
[Home] : at home, [unfilled] , at the time of the visit. [Other Location: e.g. Home (Enter Location, City,State)___] : at [unfilled] [Verbal consent obtained from patient] : the patient, [unfilled] [FreeTextEntry1] : Ms. Willams is a 27 year old female that presents [facility] that presents postpartum for blood pressure management.\par \par Patient was induced and delivered a baby girl on 2023,  secondary to the development of preeclampsia and gestational hypertension at 36 weeks gestation.\par Her original due was 2023.\par \par She was treated with IV Magnesium for seizure prophylaxis and discharged to home on Labetalol 200 mg TID and Procardia XL 90 mg QD.\par \par +Family history of hypertension, diabetes and hyperlipidemia\par \par Blood pressure today was measured at 106/72. \par \par Patient denies any issues with dizziness, shortness of breath, chest discomfort or palpitations. \par \par

## 2023-05-11 ENCOUNTER — APPOINTMENT (OUTPATIENT)
Dept: OBGYN | Facility: CLINIC | Age: 28
End: 2023-05-11
Payer: COMMERCIAL

## 2023-05-11 VITALS
HEIGHT: 63 IN | SYSTOLIC BLOOD PRESSURE: 106 MMHG | WEIGHT: 141 LBS | BODY MASS INDEX: 24.98 KG/M2 | DIASTOLIC BLOOD PRESSURE: 68 MMHG

## 2023-05-11 PROCEDURE — 0503F POSTPARTUM CARE VISIT: CPT

## 2023-05-11 NOTE — HISTORY OF PRESENT ILLNESS
[Postpartum Follow Up] : postpartum follow up [Complications:___] : no complications [] : delivered by vaginal delivery [Breastfeeding] : not currently nursing [S/Sx PP Depression] : no signs/symptoms of postpartum depression [Erythema] : not erythematous [Back to Normal] : is back to normal in size [Mild] : mild vaginal bleeding [Normal] : the vagina was normal [Cervix Sample Taken] : cervical sample not taken for a Pap smear [Not Done] : Examination of breasts not done [Doing Well] : is doing well [No Sign of Infection] : is showing no signs of infection [Excellent Pain Control] : has excellent pain control [None] : None [de-identified] : seeing cardiology. procardia lower. follow up 3 months

## 2023-05-19 ENCOUNTER — NON-APPOINTMENT (OUTPATIENT)
Age: 28
End: 2023-05-19

## 2023-05-22 ENCOUNTER — RX CHANGE (OUTPATIENT)
Age: 28
End: 2023-05-22

## 2023-05-23 ENCOUNTER — RX CHANGE (OUTPATIENT)
Age: 28
End: 2023-05-23

## 2023-06-26 ENCOUNTER — NON-APPOINTMENT (OUTPATIENT)
Age: 28
End: 2023-06-26

## 2023-07-11 ENCOUNTER — NON-APPOINTMENT (OUTPATIENT)
Age: 28
End: 2023-07-11

## 2023-07-12 ENCOUNTER — APPOINTMENT (OUTPATIENT)
Dept: CARDIOLOGY | Facility: CLINIC | Age: 28
End: 2023-07-12
Payer: COMMERCIAL

## 2023-07-12 DIAGNOSIS — R01.1 CARDIAC MURMUR, UNSPECIFIED: ICD-10-CM

## 2023-07-12 PROCEDURE — 93306 TTE W/DOPPLER COMPLETE: CPT

## 2023-07-19 ENCOUNTER — APPOINTMENT (OUTPATIENT)
Dept: CARDIOLOGY | Facility: CLINIC | Age: 28
End: 2023-07-19
Payer: COMMERCIAL

## 2023-07-19 ENCOUNTER — NON-APPOINTMENT (OUTPATIENT)
Age: 28
End: 2023-07-19

## 2023-07-19 VITALS
HEART RATE: 135 BPM | HEIGHT: 63 IN | OXYGEN SATURATION: 100 % | BODY MASS INDEX: 23.92 KG/M2 | DIASTOLIC BLOOD PRESSURE: 81 MMHG | SYSTOLIC BLOOD PRESSURE: 125 MMHG | WEIGHT: 135 LBS

## 2023-07-19 PROCEDURE — 93000 ELECTROCARDIOGRAM COMPLETE: CPT

## 2023-07-19 PROCEDURE — 99214 OFFICE O/P EST MOD 30 MIN: CPT | Mod: 25

## 2023-07-27 NOTE — DISCUSSION/SUMMARY
[EKG obtained to assist in diagnosis and management of assessed problem(s)] : EKG obtained to assist in diagnosis and management of assessed problem(s) [FreeTextEntry1] : Ms. Willams is a 28 year old female that presents [facility] that presents postpartum for blood pressure management.\par \par Patient was induced and delivered a baby girl on 2023,  secondary to the development of preeclampsia and gestational hypertension at 36 weeks gestation.\par Her original due was 2023.\par \par She was treated with IV Magnesium for seizure prophylaxis and discharged to home on Labetalol 200 mg TID and Procardia XL 90 mg QD.\par \par +Family history of hypertension, diabetes and hyperlipidemia\par \par interval note\par 23\par \par Blood pressure today was measured at 125/71 - now off of meds - has been off of the procardia for the last several weeks\par \par Patient denies any issues with dizziness, shortness of breath, chest discomfort or palpitations. \par \par #Gestational hypertension\par   Blood pressure today:   125/81\par   Off of meds\par   \par \par #Adverse Cardiovascular Risk Factors\par \par Personal history of preeclampsia, and hypertension \par Family history of hypertension, diabetes and hyperlipidemia\par ECG with no acute ischemic changes (stable from prior)\par Echocardiogram to assess cardiac structure and function - done on 23 - WNL\par Exercise stress testing to assess functional status - ordered\par Full blood work panel:  CBC,CMP, Hgb A1C, TSH, Lipid profile\par \par - Encouraged patient to participate in  healthy walking and eating habits, focusing on a Mediterranean style of eating and aiming for the recommended 150 minutes per week of moderate physical activity.\par \par - Encouraged the patient to find healthy outlets and coping mechanisms to help manage stress, such as physical activity/exercise, reducing workload if possible, spending time with family and friends, engaging in an enjoyable hobby, or using meditation or mindfulness techniques.\par \par \par \par \par \par exercise stress test\par hydration

## 2023-07-27 NOTE — HISTORY OF PRESENT ILLNESS
[FreeTextEntry1] : Ms. Willams is a 28 year old female that presents to [facility] that presents postpartum for blood pressure management.\par \par Patient was induced and delivered a baby girl on 2023,  secondary to the development of preeclampsia and gestational hypertension at 36 weeks gestation.\par Her original due was 2023.\par \par She was treated with IV Magnesium for seizure prophylaxis and discharged to home on Labetalol 200 mg TID and Procardia XL 90 mg QD.\par \par +Family history of hypertension, diabetes and hyperlipidemia\par \par interval note\par 23\par \par Blood pressure today was measured at 125/71 - now off of meds - has been off of the procardia for the last several weeks\par \par Patient denies any issues with dizziness, shortness of breath, chest discomfort or palpitations. \par \par

## 2023-08-21 PROBLEM — R01.1 MURMUR: Status: ACTIVE | Noted: 2023-08-21

## 2023-08-31 ENCOUNTER — APPOINTMENT (OUTPATIENT)
Dept: OBGYN | Facility: CLINIC | Age: 28
End: 2023-08-31
Payer: COMMERCIAL

## 2023-08-31 VITALS
HEIGHT: 63 IN | WEIGHT: 135 LBS | DIASTOLIC BLOOD PRESSURE: 80 MMHG | SYSTOLIC BLOOD PRESSURE: 132 MMHG | BODY MASS INDEX: 23.92 KG/M2

## 2023-08-31 DIAGNOSIS — Z01.419 ENCOUNTER FOR GYNECOLOGICAL EXAMINATION (GENERAL) (ROUTINE) W/OUT ABNORMAL FINDINGS: ICD-10-CM

## 2023-08-31 PROCEDURE — 99395 PREV VISIT EST AGE 18-39: CPT

## 2023-08-31 RX ORDER — NIFEDIPINE 90 MG/1
90 TABLET, EXTENDED RELEASE ORAL DAILY
Qty: 90 | Refills: 3 | Status: DISCONTINUED | COMMUNITY
Start: 2023-04-13 | End: 2023-08-31

## 2023-08-31 RX ORDER — NIFEDIPINE 30 MG/1
30 TABLET, EXTENDED RELEASE ORAL
Qty: 60 | Refills: 3 | Status: DISCONTINUED | COMMUNITY
Start: 2023-04-26 | End: 2023-08-31

## 2023-08-31 RX ORDER — LABETALOL HYDROCHLORIDE 100 MG/1
100 TABLET, FILM COATED ORAL 3 TIMES DAILY
Qty: 180 | Refills: 1 | Status: DISCONTINUED | COMMUNITY
Start: 2023-04-13 | End: 2023-08-31

## 2023-08-31 RX ORDER — PNV 24/IRON AA CHEL/FOLIC ACID 30 MG-975
TABLET ORAL
Refills: 0 | Status: ACTIVE | COMMUNITY

## 2023-08-31 NOTE — PLAN
[FreeTextEntry1] : PATIENT PRESENTS FOR ANNUAL VISIT. PATIENT DOING WELL.. SELF BREAST EXAM RECOMMENDED. DIET AND EXERCISE DISCUSSED. PATIENT ADVISED TO CONTACT WITH ANY CHANGES IN HEALTH. CONTRACEPTION DISCUSSED. DISCUSSED FIBROIDS. WILL REPEAT SONO. DISCUSSED FUTURE PREGNANCIES.

## 2023-08-31 NOTE — COUNSELING
[Breast Self Exam] : breast self exam [Bladder Hygiene] : bladder hygiene [Contraception/ Emergency Contraception/ Safe Sexual Practices] : contraception, emergency contraception, safe sexual practices [Vaccines] : vaccines [Influenza Vaccine] : influenza vaccine

## 2023-09-29 LAB — CYTOLOGY CVX/VAG DOC THIN PREP: NORMAL

## 2024-03-05 NOTE — DISCHARGE NOTE OB - NSTOBACCOUSAGEY/N_GEN_A_CS
JCM patient states pharmacy  has not responded to request for two meds only got 1 needs new order of metoprolol asking for 90 day supply with 3 refills  
No

## 2024-10-03 ENCOUNTER — APPOINTMENT (OUTPATIENT)
Dept: OBGYN | Facility: CLINIC | Age: 29
End: 2024-10-03
Payer: COMMERCIAL

## 2024-10-03 VITALS
BODY MASS INDEX: 24.1 KG/M2 | SYSTOLIC BLOOD PRESSURE: 128 MMHG | HEIGHT: 63 IN | DIASTOLIC BLOOD PRESSURE: 72 MMHG | WEIGHT: 136 LBS

## 2024-10-03 PROCEDURE — 99395 PREV VISIT EST AGE 18-39: CPT

## 2024-10-03 NOTE — HISTORY OF PRESENT ILLNESS
[FreeTextEntry1] : HPI: Patient is a 28yo female presenting for her well woman exam. Patient is without complaints today.   ROS: neg unless specified in HPI   Gyn Hx: Menses: monthly No known fibroids, ovarian cysts, or other gynecologic problems Pap: 2023 NILM Contraception: none  Gyn: Normal appearing external genitalia, normal appearing vagina and cervix, no CMT, bimanual with normal sized mobile uterus, no fixed adnexal masses or tenderness  Breast: No lymphadenopathy in the neck, chest wall, bilateral supraclavicular, infraclavicular, and bilateral axillary areas. No overt asymmetry in bilateral breast contour with normal appearing skin and normal appearing nipple areolar complex bilaterally. No nipple discharge expressed.   Plan: Gyn Screening: - Pap: cytology + automatic HR HPV sent per request - Contraception: on PNV, ready to conceive  - Recommended HPV vaccine - STI screening: GC/CT/Trich sent   Breast Screening: - Discussed self-breast exam - Clinical breast exam performed  AHM: CV, Pulmonary, Endocrine, GI, Bone Screening, Vitamin supplementation, and Immunizations with PCP SALLY Bernstein MD

## 2024-10-04 LAB — HPV HIGH+LOW RISK DNA PNL CVX: NOT DETECTED

## 2024-10-07 LAB — CYTOLOGY CVX/VAG DOC THIN PREP: NORMAL

## 2024-10-18 ENCOUNTER — TRANSCRIPTION ENCOUNTER (OUTPATIENT)
Age: 29
End: 2024-10-18

## 2024-10-29 ENCOUNTER — TRANSCRIPTION ENCOUNTER (OUTPATIENT)
Age: 29
End: 2024-10-29

## 2024-11-19 ENCOUNTER — APPOINTMENT (OUTPATIENT)
Dept: OBGYN | Facility: CLINIC | Age: 29
End: 2024-11-19

## 2024-11-19 VITALS
DIASTOLIC BLOOD PRESSURE: 74 MMHG | BODY MASS INDEX: 24.45 KG/M2 | WEIGHT: 138 LBS | HEIGHT: 63 IN | RESPIRATION RATE: 16 BRPM | SYSTOLIC BLOOD PRESSURE: 120 MMHG | HEART RATE: 80 BPM

## 2024-11-19 PROCEDURE — 99215 OFFICE O/P EST HI 40 MIN: CPT | Mod: 25

## 2024-11-19 PROCEDURE — 76817 TRANSVAGINAL US OBSTETRIC: CPT

## 2024-12-02 ENCOUNTER — NON-APPOINTMENT (OUTPATIENT)
Age: 29
End: 2024-12-02

## 2024-12-02 ENCOUNTER — TRANSCRIPTION ENCOUNTER (OUTPATIENT)
Age: 29
End: 2024-12-02

## 2024-12-03 ENCOUNTER — TRANSCRIPTION ENCOUNTER (OUTPATIENT)
Age: 29
End: 2024-12-03

## 2024-12-11 ENCOUNTER — TRANSCRIPTION ENCOUNTER (OUTPATIENT)
Age: 29
End: 2024-12-11

## 2024-12-12 ENCOUNTER — NON-APPOINTMENT (OUTPATIENT)
Age: 29
End: 2024-12-12

## 2024-12-17 ENCOUNTER — NON-APPOINTMENT (OUTPATIENT)
Age: 29
End: 2024-12-17

## 2024-12-17 ENCOUNTER — ASOB RESULT (OUTPATIENT)
Age: 29
End: 2024-12-17

## 2024-12-17 ENCOUNTER — APPOINTMENT (OUTPATIENT)
Dept: ANTEPARTUM | Facility: CLINIC | Age: 29
End: 2024-12-17
Payer: COMMERCIAL

## 2024-12-17 ENCOUNTER — APPOINTMENT (OUTPATIENT)
Dept: MATERNAL FETAL MEDICINE | Facility: CLINIC | Age: 29
End: 2024-12-17
Payer: COMMERCIAL

## 2024-12-17 VITALS
RESPIRATION RATE: 16 BRPM | BODY MASS INDEX: 24.8 KG/M2 | WEIGHT: 140 LBS | SYSTOLIC BLOOD PRESSURE: 138 MMHG | DIASTOLIC BLOOD PRESSURE: 86 MMHG | OXYGEN SATURATION: 98 % | HEIGHT: 63 IN | HEART RATE: 102 BPM

## 2024-12-17 DIAGNOSIS — Z34.93 ENCOUNTER FOR SUPERVISION OF NORMAL PREGNANCY, UNSPECIFIED, THIRD TRIMESTER: ICD-10-CM

## 2024-12-17 DIAGNOSIS — Z3A.35 35 WEEKS GESTATION OF PREGNANCY: ICD-10-CM

## 2024-12-17 DIAGNOSIS — O13.9 GESTATIONAL [PREGNANCY-INDUCED] HYPERTENSION W/OUT SIGNIFICANT PROTEINURIA, UNSPECIFIED TRIMESTER: ICD-10-CM

## 2024-12-17 DIAGNOSIS — Z78.9 OTHER SPECIFIED HEALTH STATUS: ICD-10-CM

## 2024-12-17 DIAGNOSIS — O09.291 SUPERVISION OF PREGNANCY WITH OTHER POOR REPRODUCTIVE OR OBSTETRIC HISTORY, FIRST TRIMESTER: ICD-10-CM

## 2024-12-17 DIAGNOSIS — Z3A.31 31 WEEKS GESTATION OF PREGNANCY: ICD-10-CM

## 2024-12-17 DIAGNOSIS — Z87.51 PERSONAL HISTORY OF PRE-TERM LABOR: ICD-10-CM

## 2024-12-17 DIAGNOSIS — Z3A.33 33 WEEKS GESTATION OF PREGNANCY: ICD-10-CM

## 2024-12-17 DIAGNOSIS — Z3A.13 13 WEEKS GESTATION OF PREGNANCY: ICD-10-CM

## 2024-12-17 DIAGNOSIS — Z01.419 ENCOUNTER FOR GYNECOLOGICAL EXAMINATION (GENERAL) (ROUTINE) W/OUT ABNORMAL FINDINGS: ICD-10-CM

## 2024-12-17 DIAGNOSIS — Z86.79 PERSONAL HISTORY OF OTHER DISEASES OF THE CIRCULATORY SYSTEM: ICD-10-CM

## 2024-12-17 DIAGNOSIS — Z3A.27 27 WEEKS GESTATION OF PREGNANCY: ICD-10-CM

## 2024-12-17 DIAGNOSIS — Z13.79 ENCOUNTER FOR OTHER SCREENING FOR GENETIC AND CHROMOSOMAL ANOMALIES: ICD-10-CM

## 2024-12-17 DIAGNOSIS — Z3A.29 29 WEEKS GESTATION OF PREGNANCY: ICD-10-CM

## 2024-12-17 PROCEDURE — 36415 COLL VENOUS BLD VENIPUNCTURE: CPT

## 2024-12-17 PROCEDURE — 76813 OB US NUCHAL MEAS 1 GEST: CPT

## 2024-12-17 PROCEDURE — 99214 OFFICE O/P EST MOD 30 MIN: CPT | Mod: 25

## 2024-12-17 RX ORDER — ASPIRIN 81 MG
81 TABLET,CHEWABLE ORAL
Refills: 0 | Status: ACTIVE | COMMUNITY

## 2024-12-19 LAB
1ST TRIMESTER SCN+NT PATIENT-IMP: NORMAL
AFP MOM SERPL: 0.63
AFP PERCENTILE: 13.4
AFP SERPL-MCNC: 13.39
AGE AT DELIVERY: 32
B-HCG FREE MOM PRCTL SERPL: 5.3
B-HCG FREE MOM SERPL: 0.37
B-HCG FREE SERPL-MCNC: 12.97 NG/ML
BEFORE SCREENING RISK TRISOMY 18/13: NORMAL
CHORION TYPE: NORMAL
CURRENT SMOKER: NORMAL
DIABETES STATUS PATIENT: NORMAL
FET CRL US.MEAS: 76 MM
FET NASAL BN: PRESENT
FET NUCHAL FOLD MOM THICKNESS US.MEAS: 1.8
FET TS 21 RISK FROM MAT AGE: NORMAL
GA: NORMAL
GA: NORMAL
HX OF TRISOMY 21 QL: NO
INHIBIN A ADJ MOM SERPL: 0.43
INHIBIN A SERPL-MCNC: 85.8 PG/ML
INHIBIN PERCENTILE: 4.7
MATERNAL RISK FACTORS: NORMAL
MULTIPLE PREGNANCY: NORMAL
NUCHAL  TRANSLUCENCY  MOM: 1.03
PAPP-A ADJ MOM SERPL: 0.91
PAPP-A MOM PRCTL SERPL: 42.8
PAPP-A SERPL-ACNC: 5783 MU/L
PIGF SER-MCNC: 62.88 PG/ML
PLGF MOM: 1.23
PLGF PERCENTILE: 70.3
RECOM F/U: NO
SONOGRAPHER NAME: NORMAL
TEST PERFORMANCE INFO SPEC: NORMAL
TRISOMY 18+13 RISK FETUS: NORMAL
TS 21 RISK CTO FETUS: NORMAL
TS 21 RISK FETUS: NORMAL
US DATE: NORMAL

## 2024-12-27 ENCOUNTER — ASOB RESULT (OUTPATIENT)
Age: 29
End: 2024-12-27

## 2024-12-27 ENCOUNTER — APPOINTMENT (OUTPATIENT)
Dept: MATERNAL FETAL MEDICINE | Facility: CLINIC | Age: 29
End: 2024-12-27
Payer: COMMERCIAL

## 2024-12-27 PROCEDURE — 99442: CPT | Mod: 95

## 2024-12-30 ENCOUNTER — NON-APPOINTMENT (OUTPATIENT)
Age: 29
End: 2024-12-30

## 2024-12-31 ENCOUNTER — LABORATORY RESULT (OUTPATIENT)
Age: 29
End: 2024-12-31

## 2024-12-31 ENCOUNTER — APPOINTMENT (OUTPATIENT)
Dept: OBGYN | Facility: CLINIC | Age: 29
End: 2024-12-31
Payer: COMMERCIAL

## 2024-12-31 VITALS — DIASTOLIC BLOOD PRESSURE: 68 MMHG | SYSTOLIC BLOOD PRESSURE: 122 MMHG | WEIGHT: 141 LBS | BODY MASS INDEX: 24.98 KG/M2

## 2024-12-31 DIAGNOSIS — Z34.92 ENCOUNTER FOR SUPERVISION OF NORMAL PREGNANCY, UNSPECIFIED, SECOND TRIMESTER: ICD-10-CM

## 2024-12-31 LAB
BILIRUB UR QL STRIP: NORMAL
CLARITY UR: CLEAR
COLLECTION METHOD: NORMAL
GLUCOSE UR-MCNC: NORMAL
HCG UR QL: 0.2 EU/DL
HGB UR QL STRIP.AUTO: NORMAL
KETONES UR-MCNC: NORMAL
LEUKOCYTE ESTERASE UR QL STRIP: NORMAL
NITRITE UR QL STRIP: NORMAL
PH UR STRIP: 5.5
PROT UR STRIP-MCNC: NORMAL
SP GR UR STRIP: >=1.03

## 2024-12-31 PROCEDURE — 81003 URINALYSIS AUTO W/O SCOPE: CPT | Mod: QW

## 2024-12-31 PROCEDURE — 36415 COLL VENOUS BLD VENIPUNCTURE: CPT

## 2024-12-31 PROCEDURE — 0500F INITIAL PRENATAL CARE VISIT: CPT

## 2025-01-02 LAB
ABO + RH PNL BLD: NORMAL
ALBUMIN SERPL ELPH-MCNC: 4.1 G/DL
ALP BLD-CCNC: 46 U/L
ALT SERPL-CCNC: 18 U/L
ANION GAP SERPL CALC-SCNC: 12 MMOL/L
APPEARANCE: ABNORMAL
AST SERPL-CCNC: 17 U/L
BASOPHILS # BLD AUTO: 0.03 K/UL
BASOPHILS NFR BLD AUTO: 0.4 %
BILIRUB SERPL-MCNC: <0.2 MG/DL
BILIRUBIN URINE: ABNORMAL
BLD GP AB SCN SERPL QL: NORMAL
BLOOD URINE: NEGATIVE
BUN SERPL-MCNC: 7 MG/DL
CALCIUM SERPL-MCNC: 9.4 MG/DL
CHLORIDE SERPL-SCNC: 105 MMOL/L
CO2 SERPL-SCNC: 20 MMOL/L
COLOR: NORMAL
CREAT SERPL-MCNC: 0.49 MG/DL
EGFR: 129 ML/MIN/1.73M2
EOSINOPHIL # BLD AUTO: 0.11 K/UL
EOSINOPHIL NFR BLD AUTO: 1.3 %
GLUCOSE QUALITATIVE U: NEGATIVE MG/DL
GLUCOSE SERPL-MCNC: 84 MG/DL
HBV SURFACE AG SER QL: NONREACTIVE
HCT VFR BLD CALC: 42.3 %
HCV AB SER QL: NONREACTIVE
HCV S/CO RATIO: 0.09 S/CO
HGB BLD-MCNC: 13.7 G/DL
HIV1+2 AB SPEC QL IA.RAPID: NONREACTIVE
IMM GRANULOCYTES NFR BLD AUTO: 0.8 %
KETONES URINE: NEGATIVE MG/DL
LEUKOCYTE ESTERASE URINE: NEGATIVE
LYMPHOCYTES # BLD AUTO: 1.33 K/UL
LYMPHOCYTES NFR BLD AUTO: 15.6 %
MAN DIFF?: NORMAL
MCHC RBC-ENTMCNC: 28.7 PG
MCHC RBC-ENTMCNC: 32.4 G/DL
MCV RBC AUTO: 88.7 FL
MEV IGG FLD QL IA: >300 AU/ML
MEV IGG+IGM SER-IMP: POSITIVE
MONOCYTES # BLD AUTO: 0.42 K/UL
MONOCYTES NFR BLD AUTO: 4.9 %
NEUTROPHILS # BLD AUTO: 6.55 K/UL
NEUTROPHILS NFR BLD AUTO: 77 %
NITRITE URINE: NEGATIVE
PH URINE: 5.5
PLATELET # BLD AUTO: 210 K/UL
POTASSIUM SERPL-SCNC: 3.6 MMOL/L
PROT SERPL-MCNC: 6.7 G/DL
PROTEIN URINE: NORMAL MG/DL
RBC # BLD: 4.77 M/UL
RBC # FLD: 13.6 %
RUBV IGG FLD-ACNC: 7.09 INDEX
RUBV IGG SER-IMP: POSITIVE
SODIUM SERPL-SCNC: 137 MMOL/L
SPECIFIC GRAVITY URINE: >1.03
T PALLIDUM AB SER QL IA: NEGATIVE
UROBILINOGEN URINE: 0.2 MG/DL
VZV AB TITR SER: NEGATIVE
VZV IGG SER IF-ACNC: 0.86 S/CO
WBC # FLD AUTO: 8.51 K/UL

## 2025-01-03 LAB
BACTERIA UR CULT: NORMAL
HGB A MFR BLD: 96.5 %
HGB A2 MFR BLD: 2.9 %
HGB F MFR BLD: 0.6 %
HGB FRACT BLD-IMP: NORMAL
LEAD BLD-MCNC: <1 UG/DL

## 2025-01-07 ENCOUNTER — APPOINTMENT (OUTPATIENT)
Dept: ANTEPARTUM | Facility: CLINIC | Age: 30
End: 2025-01-07
Payer: COMMERCIAL

## 2025-01-07 PROCEDURE — 36415 COLL VENOUS BLD VENIPUNCTURE: CPT

## 2025-01-08 LAB
1ST TRIMESTER SCN+NT PATIENT-IMP: NORMAL
AFP INTERP SERPL-IMP: NORMAL
AFP INTERP SERPL-IMP: NORMAL
AFP MOM CUT-OFF: 2.5
AFP MOM SERPL: 0.64
AFP PERCENTILE: 8.3
AFP SERPL-ACNC: 23.73 NG/ML
AGE AT DELIVERY: 32
B-HCG FREE MOM PRCTL SERPL: 13.1
B-HCG FREE MOM SERPL: 0.51
B-HCG FREE SERPL-MCNC: 6.65 NG/ML
CARBAMAZEPINE?: NO
COLLECT DATE: NORMAL
CURRENT SMOKER: NO
DIABETES STATUS PATIENT: NO
FET CRL US.MEAS: 76 MM
FET NASAL BN: PRESENT
FET NUCHAL FOLD MOM THICKNESS US.MEAS: 1.8 MM
FET TS 18 PRIOR RISK FROM MAT AGE: NORMAL
FET TS 21 RISK FROM MAT AGE: NORMAL
GA: NORMAL
HX OF NTD NARR: NORMAL
HX OF TRISOMY 21 QL: NO
INHIBIN A ADJ MOM SERPL: 0.26
INHIBIN PERCENTILE: 0.1
INHIBIN SERPL-MCNC: 37.4 PG/ML
MATERNAL RISK FACTORS: NORMAL
MULTIPLE PREGNANCY: NORMAL
NEURAL TUBE DEFECT RISK FETUS: NORMAL
NEURAL TUBE DEFECT RISK POP: NORMAL
NUCHAL  TRANSLUCENCY  MOM: 1.03
PAPP-A ADJ MOM SERPL: 0.91
PAPP-A MOM PRCTL SERPL: 42.8
PAPP-A SERPL-ACNC: 5783 MIU/L
RECOM F/U: NO
TEST PERFORMANCE INFO SPEC: NORMAL
TRISOMY 18+13 RISK FETUS: NORMAL
TS 21 RISK CTO FETUS: NORMAL
TS 21 RISK FETUS: NORMAL
U ESTRIOL ADJ MOM SERPL: 0.52
U ESTRIOL SERPL-SCNC: 0.84 NMOL/L
UNCONJUGATED ESTRIOL PERCENTILE: 11.5
US DATE: NORMAL
VALPROIC ACID?: NORMAL

## 2025-01-10 LAB
M TB IFN-G BLD-IMP: ABNORMAL
QUANTIFERON TB PLUS MITOGEN MINUS NIL: 0.31 IU/ML
QUANTIFERON TB PLUS NIL: 0.02 IU/ML
QUANTIFERON TB PLUS TB1 MINUS NIL: 0 IU/ML
QUANTIFERON TB PLUS TB2 MINUS NIL: 0 IU/ML

## 2025-01-14 ENCOUNTER — NON-APPOINTMENT (OUTPATIENT)
Age: 30
End: 2025-01-14

## 2025-01-17 ENCOUNTER — NON-APPOINTMENT (OUTPATIENT)
Age: 30
End: 2025-01-17

## 2025-01-28 ENCOUNTER — NON-APPOINTMENT (OUTPATIENT)
Age: 30
End: 2025-01-28

## 2025-01-29 ENCOUNTER — APPOINTMENT (OUTPATIENT)
Dept: OBGYN | Facility: CLINIC | Age: 30
End: 2025-01-29
Payer: COMMERCIAL

## 2025-01-29 VITALS
SYSTOLIC BLOOD PRESSURE: 120 MMHG | HEIGHT: 63 IN | BODY MASS INDEX: 25.34 KG/M2 | DIASTOLIC BLOOD PRESSURE: 80 MMHG | WEIGHT: 143 LBS

## 2025-01-29 DIAGNOSIS — Z34.92 ENCOUNTER FOR SUPERVISION OF NORMAL PREGNANCY, UNSPECIFIED, SECOND TRIMESTER: ICD-10-CM

## 2025-01-29 LAB
BILIRUB UR QL STRIP: NORMAL
GLUCOSE UR-MCNC: NORMAL
HCG UR QL: 0.2 EU/DL
HGB UR QL STRIP.AUTO: NORMAL
KETONES UR-MCNC: NORMAL
LEUKOCYTE ESTERASE UR QL STRIP: NORMAL
NITRITE UR QL STRIP: NORMAL
PH UR STRIP: 6.5
PROT UR STRIP-MCNC: NORMAL
SP GR UR STRIP: 1.01

## 2025-01-29 PROCEDURE — 0502F SUBSEQUENT PRENATAL CARE: CPT

## 2025-01-29 PROCEDURE — 81003 URINALYSIS AUTO W/O SCOPE: CPT | Mod: QW

## 2025-02-03 LAB
CREAT SPEC-SCNC: 7 MG/DL
CREAT/PROT UR: NORMAL RATIO
PROT UR-MCNC: <4 MG/DL

## 2025-02-12 ENCOUNTER — ASOB RESULT (OUTPATIENT)
Age: 30
End: 2025-02-12

## 2025-02-12 ENCOUNTER — APPOINTMENT (OUTPATIENT)
Dept: ANTEPARTUM | Facility: CLINIC | Age: 30
End: 2025-02-12
Payer: COMMERCIAL

## 2025-02-12 PROCEDURE — 76811 OB US DETAILED SNGL FETUS: CPT

## 2025-02-12 PROCEDURE — 76817 TRANSVAGINAL US OBSTETRIC: CPT

## 2025-02-25 ENCOUNTER — NON-APPOINTMENT (OUTPATIENT)
Age: 30
End: 2025-02-25

## 2025-02-26 ENCOUNTER — APPOINTMENT (OUTPATIENT)
Dept: OBGYN | Facility: CLINIC | Age: 30
End: 2025-02-26
Payer: COMMERCIAL

## 2025-02-26 VITALS
SYSTOLIC BLOOD PRESSURE: 148 MMHG | HEIGHT: 63 IN | WEIGHT: 143 LBS | BODY MASS INDEX: 25.34 KG/M2 | DIASTOLIC BLOOD PRESSURE: 60 MMHG

## 2025-02-26 VITALS — DIASTOLIC BLOOD PRESSURE: 60 MMHG | SYSTOLIC BLOOD PRESSURE: 150 MMHG

## 2025-02-26 DIAGNOSIS — Z34.92 ENCOUNTER FOR SUPERVISION OF NORMAL PREGNANCY, UNSPECIFIED, SECOND TRIMESTER: ICD-10-CM

## 2025-02-26 PROCEDURE — 36415 COLL VENOUS BLD VENIPUNCTURE: CPT

## 2025-02-26 PROCEDURE — 81003 URINALYSIS AUTO W/O SCOPE: CPT | Mod: QW

## 2025-02-26 PROCEDURE — 0502F SUBSEQUENT PRENATAL CARE: CPT

## 2025-02-27 ENCOUNTER — TRANSCRIPTION ENCOUNTER (OUTPATIENT)
Age: 30
End: 2025-02-27

## 2025-02-28 LAB
ALBUMIN SERPL ELPH-MCNC: 4.3 G/DL
ALP BLD-CCNC: 63 U/L
ALT SERPL-CCNC: 18 U/L
ANION GAP SERPL CALC-SCNC: 16 MMOL/L
APPEARANCE: CLEAR
AST SERPL-CCNC: 21 U/L
BASOPHILS # BLD AUTO: 0.03 K/UL
BASOPHILS NFR BLD AUTO: 0.3 %
BILIRUB SERPL-MCNC: 0.2 MG/DL
BILIRUBIN URINE: NEGATIVE
BLOOD URINE: NEGATIVE
BUN SERPL-MCNC: 8 MG/DL
CALCIUM SERPL-MCNC: 9.4 MG/DL
CHLORIDE SERPL-SCNC: 102 MMOL/L
CO2 SERPL-SCNC: 21 MMOL/L
COLOR: YELLOW
CREAT SERPL-MCNC: 0.46 MG/DL
CREAT SPEC-SCNC: 13 MG/DL
CREAT/PROT UR: NORMAL RATIO
EGFR: 131 ML/MIN/1.73M2
EOSINOPHIL # BLD AUTO: 0.13 K/UL
EOSINOPHIL NFR BLD AUTO: 1.2 %
GLUCOSE QUALITATIVE U: NEGATIVE
GLUCOSE SERPL-MCNC: 51 MG/DL
HCT VFR BLD CALC: 37.9 %
HGB BLD-MCNC: 13.2 G/DL
IMM GRANULOCYTES NFR BLD AUTO: 1.5 %
KETONES URINE: NEGATIVE
LEUKOCYTE ESTERASE URINE: NEGATIVE
LYMPHOCYTES # BLD AUTO: 1.5 K/UL
LYMPHOCYTES NFR BLD AUTO: 13.6 %
MAN DIFF?: NORMAL
MCHC RBC-ENTMCNC: 30.2 PG
MCHC RBC-ENTMCNC: 34.8 G/DL
MCV RBC AUTO: 86.7 FL
MONOCYTES # BLD AUTO: 0.7 K/UL
MONOCYTES NFR BLD AUTO: 6.3 %
NEUTROPHILS # BLD AUTO: 8.51 K/UL
NEUTROPHILS NFR BLD AUTO: 77.1 %
NITRITE URINE: NEGATIVE
PH URINE: 6
PLATELET # BLD AUTO: 190 K/UL
POTASSIUM SERPL-SCNC: 4.6 MMOL/L
PROT SERPL-MCNC: 7.1 G/DL
PROT UR-MCNC: <4 MG/DL
PROTEIN URINE: NEGATIVE
RBC # BLD: 4.37 M/UL
RBC # FLD: 13.3 %
SODIUM SERPL-SCNC: 139 MMOL/L
SPECIFIC GRAVITY URINE: <=1.005
UROBILINOGEN URINE: 0.2 (ref 0.2–?)
WBC # FLD AUTO: 11.04 K/UL

## 2025-03-03 ENCOUNTER — TRANSCRIPTION ENCOUNTER (OUTPATIENT)
Age: 30
End: 2025-03-03

## 2025-03-03 ENCOUNTER — ASOB RESULT (OUTPATIENT)
Age: 30
End: 2025-03-03

## 2025-03-03 ENCOUNTER — APPOINTMENT (OUTPATIENT)
Dept: MATERNAL FETAL MEDICINE | Facility: CLINIC | Age: 30
End: 2025-03-03
Payer: COMMERCIAL

## 2025-03-03 ENCOUNTER — APPOINTMENT (OUTPATIENT)
Dept: ANTEPARTUM | Facility: CLINIC | Age: 30
End: 2025-03-03
Payer: COMMERCIAL

## 2025-03-03 VITALS
RESPIRATION RATE: 18 BRPM | OXYGEN SATURATION: 98 % | SYSTOLIC BLOOD PRESSURE: 130 MMHG | WEIGHT: 145 LBS | HEIGHT: 63 IN | DIASTOLIC BLOOD PRESSURE: 74 MMHG | BODY MASS INDEX: 25.69 KG/M2

## 2025-03-03 DIAGNOSIS — Z87.51 PERSONAL HISTORY OF PRE-TERM LABOR: ICD-10-CM

## 2025-03-03 DIAGNOSIS — R03.0 ELEVATED BLOOD-PRESSURE READING, W/OUT DIAGNOSIS OF HYPERTENSION: ICD-10-CM

## 2025-03-03 DIAGNOSIS — O09.291 SUPERVISION OF PREGNANCY WITH OTHER POOR REPRODUCTIVE OR OBSTETRIC HISTORY, FIRST TRIMESTER: ICD-10-CM

## 2025-03-03 DIAGNOSIS — Z3A.24 24 WEEKS GESTATION OF PREGNANCY: ICD-10-CM

## 2025-03-03 PROCEDURE — 99214 OFFICE O/P EST MOD 30 MIN: CPT

## 2025-03-03 PROCEDURE — 76816 OB US FOLLOW-UP PER FETUS: CPT

## 2025-03-05 ENCOUNTER — NON-APPOINTMENT (OUTPATIENT)
Age: 30
End: 2025-03-05

## 2025-03-20 ENCOUNTER — NON-APPOINTMENT (OUTPATIENT)
Age: 30
End: 2025-03-20

## 2025-03-26 ENCOUNTER — APPOINTMENT (OUTPATIENT)
Dept: OBGYN | Facility: CLINIC | Age: 30
End: 2025-03-26
Payer: COMMERCIAL

## 2025-03-26 VITALS
DIASTOLIC BLOOD PRESSURE: 68 MMHG | WEIGHT: 146 LBS | BODY MASS INDEX: 25.87 KG/M2 | HEIGHT: 63 IN | SYSTOLIC BLOOD PRESSURE: 120 MMHG

## 2025-03-26 PROCEDURE — 36415 COLL VENOUS BLD VENIPUNCTURE: CPT

## 2025-03-26 PROCEDURE — 0502F SUBSEQUENT PRENATAL CARE: CPT

## 2025-03-27 ENCOUNTER — TRANSCRIPTION ENCOUNTER (OUTPATIENT)
Age: 30
End: 2025-03-27

## 2025-03-27 LAB
BASOPHILS # BLD AUTO: 0.06 K/UL
BASOPHILS NFR BLD AUTO: 0.5 %
EOSINOPHIL # BLD AUTO: 0.12 K/UL
EOSINOPHIL NFR BLD AUTO: 1 %
GLUCOSE 1H P 50 G GLC PO SERPL-MCNC: 143 MG/DL
HCT VFR BLD CALC: 36.9 %
HGB BLD-MCNC: 12.4 G/DL
IMM GRANULOCYTES NFR BLD AUTO: 2.2 %
LYMPHOCYTES # BLD AUTO: 1.36 K/UL
LYMPHOCYTES NFR BLD AUTO: 11.4 %
MAN DIFF?: NORMAL
MCHC RBC-ENTMCNC: 30.5 PG
MCHC RBC-ENTMCNC: 33.6 G/DL
MCV RBC AUTO: 90.7 FL
MONOCYTES # BLD AUTO: 0.64 K/UL
MONOCYTES NFR BLD AUTO: 5.4 %
NEUTROPHILS # BLD AUTO: 9.48 K/UL
NEUTROPHILS NFR BLD AUTO: 79.5 %
PLATELET # BLD AUTO: 186 K/UL
RBC # BLD: 4.07 M/UL
RBC # FLD: 13.2 %
T PALLIDUM AB SER QL IA: NEGATIVE
WBC # FLD AUTO: 11.92 K/UL

## 2025-03-28 ENCOUNTER — TRANSCRIPTION ENCOUNTER (OUTPATIENT)
Age: 30
End: 2025-03-28

## 2025-04-02 ENCOUNTER — APPOINTMENT (OUTPATIENT)
Dept: ANTEPARTUM | Facility: CLINIC | Age: 30
End: 2025-04-02
Payer: COMMERCIAL

## 2025-04-02 ENCOUNTER — ASOB RESULT (OUTPATIENT)
Age: 30
End: 2025-04-02

## 2025-04-02 ENCOUNTER — APPOINTMENT (OUTPATIENT)
Dept: MATERNAL FETAL MEDICINE | Facility: CLINIC | Age: 30
End: 2025-04-02

## 2025-04-02 PROCEDURE — 76816 OB US FOLLOW-UP PER FETUS: CPT

## 2025-04-08 ENCOUNTER — TRANSCRIPTION ENCOUNTER (OUTPATIENT)
Age: 30
End: 2025-04-08

## 2025-04-29 ENCOUNTER — NON-APPOINTMENT (OUTPATIENT)
Age: 30
End: 2025-04-29

## 2025-04-30 ENCOUNTER — APPOINTMENT (OUTPATIENT)
Dept: OBGYN | Facility: CLINIC | Age: 30
End: 2025-04-30
Payer: COMMERCIAL

## 2025-04-30 VITALS
SYSTOLIC BLOOD PRESSURE: 120 MMHG | DIASTOLIC BLOOD PRESSURE: 80 MMHG | HEIGHT: 63 IN | BODY MASS INDEX: 27.11 KG/M2 | WEIGHT: 153 LBS

## 2025-04-30 DIAGNOSIS — Z34.92 ENCOUNTER FOR SUPERVISION OF NORMAL PREGNANCY, UNSPECIFIED, SECOND TRIMESTER: ICD-10-CM

## 2025-04-30 PROCEDURE — 0502F SUBSEQUENT PRENATAL CARE: CPT

## 2025-04-30 PROCEDURE — 90715 TDAP VACCINE 7 YRS/> IM: CPT

## 2025-04-30 PROCEDURE — 90471 IMMUNIZATION ADMIN: CPT

## 2025-05-01 ENCOUNTER — ASOB RESULT (OUTPATIENT)
Age: 30
End: 2025-05-01

## 2025-05-01 ENCOUNTER — APPOINTMENT (OUTPATIENT)
Dept: ANTEPARTUM | Facility: CLINIC | Age: 30
End: 2025-05-01
Payer: COMMERCIAL

## 2025-05-01 PROCEDURE — 76816 OB US FOLLOW-UP PER FETUS: CPT

## 2025-05-01 PROCEDURE — 76819 FETAL BIOPHYS PROFIL W/O NST: CPT

## 2025-05-05 ENCOUNTER — NON-APPOINTMENT (OUTPATIENT)
Age: 30
End: 2025-05-05

## 2025-05-21 ENCOUNTER — APPOINTMENT (OUTPATIENT)
Dept: OBGYN | Facility: CLINIC | Age: 30
End: 2025-05-21
Payer: COMMERCIAL

## 2025-05-21 VITALS
WEIGHT: 153.25 LBS | HEIGHT: 63 IN | SYSTOLIC BLOOD PRESSURE: 122 MMHG | DIASTOLIC BLOOD PRESSURE: 66 MMHG | BODY MASS INDEX: 27.15 KG/M2

## 2025-05-21 DIAGNOSIS — Z34.93 ENCOUNTER FOR SUPERVISION OF NORMAL PREGNANCY, UNSPECIFIED, THIRD TRIMESTER: ICD-10-CM

## 2025-05-21 PROCEDURE — 0502F SUBSEQUENT PRENATAL CARE: CPT

## 2025-05-22 ENCOUNTER — TRANSCRIPTION ENCOUNTER (OUTPATIENT)
Age: 30
End: 2025-05-22

## 2025-05-22 LAB
BASOPHILS # BLD AUTO: 0.04 K/UL
BASOPHILS NFR BLD AUTO: 0.4 %
EOSINOPHIL # BLD AUTO: 0.1 K/UL
EOSINOPHIL NFR BLD AUTO: 1 %
HCT VFR BLD CALC: 38.3 %
HGB BLD-MCNC: 12.9 G/DL
HIV1+2 AB SPEC QL IA.RAPID: NONREACTIVE
IMM GRANULOCYTES NFR BLD AUTO: 2.5 %
LYMPHOCYTES # BLD AUTO: 1.32 K/UL
LYMPHOCYTES NFR BLD AUTO: 13 %
MAN DIFF?: NORMAL
MCHC RBC-ENTMCNC: 30.4 PG
MCHC RBC-ENTMCNC: 33.7 G/DL
MCV RBC AUTO: 90.3 FL
MONOCYTES # BLD AUTO: 0.71 K/UL
MONOCYTES NFR BLD AUTO: 7 %
NEUTROPHILS # BLD AUTO: 7.76 K/UL
NEUTROPHILS NFR BLD AUTO: 76.1 %
PLATELET # BLD AUTO: 161 K/UL
RBC # BLD: 4.24 M/UL
RBC # FLD: 12.8 %
WBC # FLD AUTO: 10.18 K/UL

## 2025-05-27 LAB — B-HEM STREP SPEC QL CULT: NORMAL

## 2025-06-02 ENCOUNTER — NON-APPOINTMENT (OUTPATIENT)
Age: 30
End: 2025-06-02

## 2025-06-03 ENCOUNTER — APPOINTMENT (OUTPATIENT)
Dept: OBGYN | Facility: CLINIC | Age: 30
End: 2025-06-03

## 2025-06-03 VITALS
BODY MASS INDEX: 27.46 KG/M2 | DIASTOLIC BLOOD PRESSURE: 62 MMHG | HEIGHT: 63 IN | SYSTOLIC BLOOD PRESSURE: 110 MMHG | WEIGHT: 155 LBS

## 2025-06-03 PROCEDURE — 0502F SUBSEQUENT PRENATAL CARE: CPT

## 2025-06-10 ENCOUNTER — APPOINTMENT (OUTPATIENT)
Dept: OBGYN | Facility: CLINIC | Age: 30
End: 2025-06-10
Payer: COMMERCIAL

## 2025-06-10 VITALS
HEIGHT: 63 IN | BODY MASS INDEX: 27.46 KG/M2 | WEIGHT: 155 LBS | SYSTOLIC BLOOD PRESSURE: 114 MMHG | DIASTOLIC BLOOD PRESSURE: 80 MMHG

## 2025-06-10 PROCEDURE — 0502F SUBSEQUENT PRENATAL CARE: CPT

## 2025-06-17 ENCOUNTER — APPOINTMENT (OUTPATIENT)
Dept: OBGYN | Facility: CLINIC | Age: 30
End: 2025-06-17
Payer: COMMERCIAL

## 2025-06-17 VITALS
BODY MASS INDEX: 27.29 KG/M2 | HEIGHT: 63 IN | WEIGHT: 154 LBS | DIASTOLIC BLOOD PRESSURE: 64 MMHG | SYSTOLIC BLOOD PRESSURE: 118 MMHG

## 2025-06-17 PROCEDURE — 0502F SUBSEQUENT PRENATAL CARE: CPT

## 2025-06-20 ENCOUNTER — INPATIENT (INPATIENT)
Facility: HOSPITAL | Age: 30
LOS: 1 days | Discharge: HOME | DRG: 833 | End: 2025-06-22
Attending: OBSTETRICS & GYNECOLOGY | Admitting: OBSTETRICS & GYNECOLOGY
Payer: COMMERCIAL

## 2025-06-20 VITALS
RESPIRATION RATE: 18 BRPM | HEART RATE: 139 BPM | TEMPERATURE: 98 F | SYSTOLIC BLOOD PRESSURE: 148 MMHG | DIASTOLIC BLOOD PRESSURE: 101 MMHG

## 2025-06-20 DIAGNOSIS — R00.0 TACHYCARDIA, UNSPECIFIED: ICD-10-CM

## 2025-06-20 DIAGNOSIS — O26.899 OTHER SPECIFIED PREGNANCY RELATED CONDITIONS, UNSPECIFIED TRIMESTER: ICD-10-CM

## 2025-06-20 DIAGNOSIS — Z3A.00 WEEKS OF GESTATION OF PREGNANCY NOT SPECIFIED: ICD-10-CM

## 2025-06-20 LAB
ALBUMIN SERPL ELPH-MCNC: 3 G/DL — LOW (ref 3.3–5)
ALP SERPL-CCNC: 164 U/L — HIGH (ref 40–120)
ALT FLD-CCNC: 29 U/L — SIGNIFICANT CHANGE UP (ref 12–78)
ANION GAP SERPL CALC-SCNC: 8 MMOL/L — SIGNIFICANT CHANGE UP (ref 5–17)
APPEARANCE UR: CLEAR — SIGNIFICANT CHANGE UP
AST SERPL-CCNC: 28 U/L — SIGNIFICANT CHANGE UP (ref 15–37)
BACTERIA # UR AUTO: NEGATIVE /HPF — SIGNIFICANT CHANGE UP
BASOPHILS # BLD AUTO: 0.04 K/UL — SIGNIFICANT CHANGE UP (ref 0–0.2)
BASOPHILS NFR BLD AUTO: 0.4 % — SIGNIFICANT CHANGE UP (ref 0–2)
BILIRUB SERPL-MCNC: 0.3 MG/DL — SIGNIFICANT CHANGE UP (ref 0.2–1.2)
BILIRUB UR-MCNC: NEGATIVE — SIGNIFICANT CHANGE UP
BLD GP AB SCN SERPL QL: SIGNIFICANT CHANGE UP
BUN SERPL-MCNC: 9 MG/DL — SIGNIFICANT CHANGE UP (ref 7–23)
CALCIUM SERPL-MCNC: 9.3 MG/DL — SIGNIFICANT CHANGE UP (ref 8.5–10.1)
CAST: 1 /LPF — SIGNIFICANT CHANGE UP (ref 0–4)
CHLORIDE SERPL-SCNC: 109 MMOL/L — HIGH (ref 96–108)
CO2 SERPL-SCNC: 21 MMOL/L — LOW (ref 22–31)
COLOR SPEC: YELLOW — SIGNIFICANT CHANGE UP
CREAT ?TM UR-MCNC: 70 MG/DL — SIGNIFICANT CHANGE UP
CREAT SERPL-MCNC: 0.61 MG/DL — SIGNIFICANT CHANGE UP (ref 0.5–1.3)
DIFF PNL FLD: ABNORMAL
EGFR: 122 ML/MIN/1.73M2 — SIGNIFICANT CHANGE UP
EGFR: 122 ML/MIN/1.73M2 — SIGNIFICANT CHANGE UP
EOSINOPHIL # BLD AUTO: 0.06 K/UL — SIGNIFICANT CHANGE UP (ref 0–0.5)
EOSINOPHIL NFR BLD AUTO: 0.6 % — SIGNIFICANT CHANGE UP (ref 0–6)
GLUCOSE SERPL-MCNC: 94 MG/DL — SIGNIFICANT CHANGE UP (ref 70–99)
GLUCOSE UR QL: NEGATIVE MG/DL — SIGNIFICANT CHANGE UP
HCT VFR BLD CALC: 41.6 % — SIGNIFICANT CHANGE UP (ref 34.5–45)
HGB BLD-MCNC: 14 G/DL — SIGNIFICANT CHANGE UP (ref 11.5–15.5)
IMM GRANULOCYTES # BLD AUTO: 0.19 K/UL — HIGH (ref 0–0.07)
IMM GRANULOCYTES NFR BLD AUTO: 1.8 % — HIGH (ref 0–0.9)
KETONES UR QL: NEGATIVE MG/DL — SIGNIFICANT CHANGE UP
LDH SERPL L TO P-CCNC: 249 U/L — HIGH (ref 84–241)
LEUKOCYTE ESTERASE UR-ACNC: NEGATIVE — SIGNIFICANT CHANGE UP
LYMPHOCYTES # BLD AUTO: 1.17 K/UL — SIGNIFICANT CHANGE UP (ref 1–3.3)
LYMPHOCYTES NFR BLD AUTO: 11.2 % — LOW (ref 13–44)
MCHC RBC-ENTMCNC: 30.2 PG — SIGNIFICANT CHANGE UP (ref 27–34)
MCHC RBC-ENTMCNC: 33.7 G/DL — SIGNIFICANT CHANGE UP (ref 32–36)
MCV RBC AUTO: 89.7 FL — SIGNIFICANT CHANGE UP (ref 80–100)
MONOCYTES # BLD AUTO: 0.68 K/UL — SIGNIFICANT CHANGE UP (ref 0–0.9)
MONOCYTES NFR BLD AUTO: 6.5 % — SIGNIFICANT CHANGE UP (ref 2–14)
NEUTROPHILS # BLD AUTO: 8.34 K/UL — HIGH (ref 1.8–7.4)
NEUTROPHILS NFR BLD AUTO: 79.5 % — HIGH (ref 43–77)
NITRITE UR-MCNC: NEGATIVE — SIGNIFICANT CHANGE UP
NRBC # BLD AUTO: 0 K/UL — SIGNIFICANT CHANGE UP (ref 0–0)
NRBC # FLD: 0 K/UL — SIGNIFICANT CHANGE UP (ref 0–0)
NRBC BLD AUTO-RTO: 0 /100 WBCS — SIGNIFICANT CHANGE UP (ref 0–0)
PH UR: 6 — SIGNIFICANT CHANGE UP (ref 5–8)
PLATELET # BLD AUTO: 178 K/UL — SIGNIFICANT CHANGE UP (ref 150–400)
PMV BLD: 10.7 FL — SIGNIFICANT CHANGE UP (ref 7–13)
POTASSIUM SERPL-MCNC: 4.1 MMOL/L — SIGNIFICANT CHANGE UP (ref 3.5–5.3)
POTASSIUM SERPL-SCNC: 4.1 MMOL/L — SIGNIFICANT CHANGE UP (ref 3.5–5.3)
PROT ?TM UR-MCNC: 15 MG/DL — HIGH (ref 0–12)
PROT SERPL-MCNC: 7.2 GM/DL — SIGNIFICANT CHANGE UP (ref 6–8.3)
PROT UR-MCNC: NEGATIVE MG/DL — SIGNIFICANT CHANGE UP
PROT/CREAT UR-RTO: 0.2 RATIO — SIGNIFICANT CHANGE UP (ref 0–0.2)
RBC # BLD: 4.64 M/UL — SIGNIFICANT CHANGE UP (ref 3.8–5.2)
RBC # FLD: 12.2 % — SIGNIFICANT CHANGE UP (ref 10.3–14.5)
RBC CASTS # UR COMP ASSIST: 21 /HPF — HIGH (ref 0–4)
SODIUM SERPL-SCNC: 138 MMOL/L — SIGNIFICANT CHANGE UP (ref 135–145)
SP GR SPEC: 1.01 — SIGNIFICANT CHANGE UP (ref 1–1.03)
SQUAMOUS # UR AUTO: 5 /HPF — SIGNIFICANT CHANGE UP (ref 0–5)
T PALLIDUM AB TITR SER: NEGATIVE — SIGNIFICANT CHANGE UP
URATE SERPL-MCNC: 3.2 MG/DL — SIGNIFICANT CHANGE UP (ref 2.5–7)
UROBILINOGEN FLD QL: 0.2 MG/DL — SIGNIFICANT CHANGE UP (ref 0.2–1)
WBC # BLD: 10.48 K/UL — SIGNIFICANT CHANGE UP (ref 3.8–10.5)
WBC # FLD AUTO: 10.48 K/UL — SIGNIFICANT CHANGE UP (ref 3.8–10.5)
WBC UR QL: 1 /HPF — SIGNIFICANT CHANGE UP (ref 0–5)

## 2025-06-20 PROCEDURE — 81001 URINALYSIS AUTO W/SCOPE: CPT

## 2025-06-20 PROCEDURE — 93005 ELECTROCARDIOGRAM TRACING: CPT

## 2025-06-20 PROCEDURE — 93010 ELECTROCARDIOGRAM REPORT: CPT

## 2025-06-20 PROCEDURE — 82570 ASSAY OF URINE CREATININE: CPT

## 2025-06-20 PROCEDURE — 99221 1ST HOSP IP/OBS SF/LOW 40: CPT

## 2025-06-20 PROCEDURE — 83615 LACTATE (LD) (LDH) ENZYME: CPT

## 2025-06-20 PROCEDURE — 86900 BLOOD TYPING SEROLOGIC ABO: CPT

## 2025-06-20 PROCEDURE — 86780 TREPONEMA PALLIDUM: CPT

## 2025-06-20 PROCEDURE — 86901 BLOOD TYPING SEROLOGIC RH(D): CPT

## 2025-06-20 PROCEDURE — 85025 COMPLETE CBC W/AUTO DIFF WBC: CPT

## 2025-06-20 PROCEDURE — 36415 COLL VENOUS BLD VENIPUNCTURE: CPT

## 2025-06-20 PROCEDURE — 85027 COMPLETE CBC AUTOMATED: CPT

## 2025-06-20 PROCEDURE — 80053 COMPREHEN METABOLIC PANEL: CPT

## 2025-06-20 PROCEDURE — 84550 ASSAY OF BLOOD/URIC ACID: CPT

## 2025-06-20 PROCEDURE — 84156 ASSAY OF PROTEIN URINE: CPT

## 2025-06-20 PROCEDURE — 86850 RBC ANTIBODY SCREEN: CPT

## 2025-06-20 RX ORDER — OXYTOCIN-SODIUM CHLORIDE 0.9% IV SOLN 30 UNIT/500ML 30-0.9/5 UT/ML-%
167 SOLUTION INTRAVENOUS
Qty: 30 | Refills: 0 | Status: DISCONTINUED | OUTPATIENT
Start: 2025-06-20 | End: 2025-06-22

## 2025-06-20 RX ORDER — BENZOCAINE 220 MG/G
1 SPRAY, METERED PERIODONTAL EVERY 6 HOURS
Refills: 0 | Status: DISCONTINUED | OUTPATIENT
Start: 2025-06-20 | End: 2025-06-22

## 2025-06-20 RX ORDER — SIMETHICONE 80 MG
80 TABLET,CHEWABLE ORAL EVERY 4 HOURS
Refills: 0 | Status: DISCONTINUED | OUTPATIENT
Start: 2025-06-20 | End: 2025-06-22

## 2025-06-20 RX ORDER — CITRIC ACID/SODIUM CITRATE 300-500 MG
15 SOLUTION, ORAL ORAL EVERY 6 HOURS
Refills: 0 | Status: DISCONTINUED | OUTPATIENT
Start: 2025-06-20 | End: 2025-06-20

## 2025-06-20 RX ORDER — IBUPROFEN 200 MG
600 TABLET ORAL EVERY 6 HOURS
Refills: 0 | Status: COMPLETED | OUTPATIENT
Start: 2025-06-20 | End: 2026-05-19

## 2025-06-20 RX ORDER — DIBUCAINE 10 MG/G
1 OINTMENT TOPICAL EVERY 6 HOURS
Refills: 0 | Status: DISCONTINUED | OUTPATIENT
Start: 2025-06-20 | End: 2025-06-22

## 2025-06-20 RX ORDER — SODIUM CHLORIDE 9 G/1000ML
1000 INJECTION, SOLUTION INTRAVENOUS
Refills: 0 | Status: DISCONTINUED | OUTPATIENT
Start: 2025-06-20 | End: 2025-06-20

## 2025-06-20 RX ORDER — PRAMOXINE HCL 1 %
1 GEL (GRAM) TOPICAL EVERY 4 HOURS
Refills: 0 | Status: DISCONTINUED | OUTPATIENT
Start: 2025-06-20 | End: 2025-06-22

## 2025-06-20 RX ORDER — HYDROCORTISONE 10 MG/G
1 CREAM TOPICAL EVERY 6 HOURS
Refills: 0 | Status: DISCONTINUED | OUTPATIENT
Start: 2025-06-20 | End: 2025-06-22

## 2025-06-20 RX ORDER — KETOROLAC TROMETHAMINE 30 MG/ML
30 INJECTION, SOLUTION INTRAMUSCULAR; INTRAVENOUS ONCE
Refills: 0 | Status: DISCONTINUED | OUTPATIENT
Start: 2025-06-20 | End: 2025-06-20

## 2025-06-20 RX ORDER — MODIFIED LANOLIN 100 %
1 CREAM (GRAM) TOPICAL EVERY 6 HOURS
Refills: 0 | Status: DISCONTINUED | OUTPATIENT
Start: 2025-06-20 | End: 2025-06-22

## 2025-06-20 RX ORDER — PRENATAL 136/IRON/FOLIC ACID 27 MG-1 MG
1 TABLET ORAL DAILY
Refills: 0 | Status: DISCONTINUED | OUTPATIENT
Start: 2025-06-20 | End: 2025-06-22

## 2025-06-20 RX ORDER — DIPHENHYDRAMINE HCL 12.5MG/5ML
25 ELIXIR ORAL EVERY 6 HOURS
Refills: 0 | Status: DISCONTINUED | OUTPATIENT
Start: 2025-06-20 | End: 2025-06-22

## 2025-06-20 RX ORDER — ACETAMINOPHEN 500 MG/5ML
975 LIQUID (ML) ORAL
Refills: 0 | Status: DISCONTINUED | OUTPATIENT
Start: 2025-06-20 | End: 2025-06-22

## 2025-06-20 RX ORDER — OXYTOCIN-SODIUM CHLORIDE 0.9% IV SOLN 30 UNIT/500ML 30-0.9/5 UT/ML-%
2 SOLUTION INTRAVENOUS
Qty: 30 | Refills: 0 | Status: DISCONTINUED | OUTPATIENT
Start: 2025-06-20 | End: 2025-06-22

## 2025-06-20 RX ORDER — WITCH HAZEL LEAF
1 FLUID EXTRACT MISCELLANEOUS EVERY 4 HOURS
Refills: 0 | Status: DISCONTINUED | OUTPATIENT
Start: 2025-06-20 | End: 2025-06-22

## 2025-06-20 RX ORDER — MAGNESIUM HYDROXIDE 400 MG/5ML
30 SUSPENSION ORAL
Refills: 0 | Status: DISCONTINUED | OUTPATIENT
Start: 2025-06-20 | End: 2025-06-22

## 2025-06-20 RX ORDER — IBUPROFEN 200 MG
600 TABLET ORAL EVERY 6 HOURS
Refills: 0 | Status: DISCONTINUED | OUTPATIENT
Start: 2025-06-20 | End: 2025-06-22

## 2025-06-20 RX ADMIN — OXYTOCIN-SODIUM CHLORIDE 0.9% IV SOLN 30 UNIT/500ML 2 MILLIUNIT(S)/MIN: 30-0.9/5 SOLUTION at 14:22

## 2025-06-20 RX ADMIN — SODIUM CHLORIDE 125 MILLILITER(S): 9 INJECTION, SOLUTION INTRAVENOUS at 14:23

## 2025-06-20 RX ADMIN — KETOROLAC TROMETHAMINE 30 MILLIGRAM(S): 30 INJECTION, SOLUTION INTRAMUSCULAR; INTRAVENOUS at 22:11

## 2025-06-20 RX ADMIN — KETOROLAC TROMETHAMINE 30 MILLIGRAM(S): 30 INJECTION, SOLUTION INTRAMUSCULAR; INTRAVENOUS at 21:22

## 2025-06-20 NOTE — CONSULT NOTE ADULT - NS ATTEND AMEND GEN_ALL_CORE FT
Case discussed with NAN Brink; ECG reviewed (sinus tachycardia); no indication for cardiac Rx at this time.

## 2025-06-20 NOTE — OB RN DELIVERY SUMMARY - NSSELHIDDEN_OBGYN_ALL_OB_FT
[NS_DeliveryAttending1_OBGYN_ALL_OB_FT:[id number]],[NS_DeliveryRN_OBGYN_ALL_OB_FT:[id number]]

## 2025-06-20 NOTE — OB PROVIDER DELIVERY SUMMARY - NSSELHIDDEN_OBGYN_ALL_OB_FT
[NS_DeliveryAttending1_OBGYN_ALL_OB_FT:[id number]],[NS_DeliveryRN_OBGYN_ALL_OB_FT:[id number]]

## 2025-06-20 NOTE — OB PROVIDER DELIVERY SUMMARY - NSPROVIDERDELIVERYNOTE_OBGYN_ALL_OB_FT
Vaginal Delivery Summary    Procedure: Normal spontaneous vaginal delivery   Findings: Viable female infant delivered in cephalic presentation at 1849, placenta delivered at 1852.  Apgar scores 9/9   Weight will be recorded after 1 hour to allow for skin-to-skin contact.  Laceration(s): second degree perineal  Repair: 2-0 vicryl  EBL: 200mL  Complications: none    Procedure:   Patient felt rectal pressure and was found to be fully dilated, +2 station. She pushed effectively. She delivered a viable female infant in SARAH position, the rest of the body delivered atraumatically. Delayed cord clamping was performed for 60 seconds. Placenta delivered intact and pitocin was started at the delivery of the infant. Perineum and vagina were inspected, second degree perineal laceration present and repaired. Adequate hemostasis was obtained. KAYLYN noted to be boggy, given rectal cytotec and noted to be firm with bimanual massage. Vaginal Delivery Summary    Procedure: Normal spontaneous vaginal delivery   Findings: Viable female infant delivered in cephalic presentation at 1849, placenta delivered at 1852.  Apgar scores 9/9   Weight will be recorded after 1 hour to allow for skin-to-skin contact.  Laceration(s): second degree perineal  Repair: 2-0 vicryl  EBL: 200mL  Complications: none    Procedure:   Patient felt rectal pressure and was found to be fully dilated, +2 station. She pushed effectively. She delivered a viable female infant in SARAH position, the rest of the body delivered atraumatically. Delayed cord clamping was performed for 60 seconds. Placenta delivered intact and pitocin was started at the delivery of the infant. Perineum and vagina were inspected, second degree perineal laceration present and repaired. Adequate hemostasis was obtained. KAYLYN noted to be boggy, given rectal cytotec and bladder straight cathed with 200mL UOP. Uterus noted to be firm with bimanual massage.

## 2025-06-20 NOTE — CONSULT NOTE ADULT - SUBJECTIVE AND OBJECTIVE BOX
CHIEF COMPLAINT: Patient is a 29y old  Female who presents with a chief complaint of     HPI:  28 y/o  @ 39w5d (SCAR 2025 via LMP 9/15/2024) sent in for ctx q 10 minutes and blood tinged VB. +FM. Pt was 1.5 cm dilated on last VE. GBS negative. EFW 1860g 4#12 21st%ile on . Pt denies LOF, HA, visual changes, CP, SOB, N/V, fever, chills, RUQ pain.    PNC complicated by:  hx sPEC prior pregnancy  hx PTD    Cardiology consulted for tachycardia. Pt. denies any chest pain, states she has sinus tachycardia HR 90s-100 at baseline, had negative cardiac workup during 1st pregnancy in .     PAST MEDICAL / SURGICAL HISTORY:  Unspecified tachycardia (s/p cardio workup after last pregnancy  unremarkable   HTN      SOCIAL HISTORY:   Alcohol: Denied  Smoking: Nonsmoker  Drug Use: Denied  Marital Status:     FAMILY HISTORY:   - sinus tachycardiac - mother  - HTN - father     Home Medications:  acetaminophen 325 mg oral tablet: 3 tab(s) orally every 6 hours (2023 22:39)  ibuprofen 600 mg oral tablet: 1 tab(s) orally every 6 hours (2023 22:39)  Prenatal Multivitamins with Folic Acid 1 mg oral tablet: 1 tab(s) orally once a day (31 Mar 2023 09:10)      MEDICATIONS  (STANDING):  chlorhexidine 2% Cloths 1 Application(s) Topical daily  citric acid/sodium citrate Solution 15 milliLiter(s) Oral every 6 hours  lactated ringers. 1000 milliLiter(s) (125 mL/Hr) IV Continuous <Continuous>  oxytocin Infusion 167 milliUNIT(s)/Min (167 mL/Hr) IV Continuous <Continuous>  oxytocin Infusion. 2 milliUNIT(s)/Min (2 mL/Hr) IV Continuous <Continuous>    Allergies - No Known Allergies    REVIEW OF SYSTEMS: All other review of systems is negative unless indicated above    VITAL SIGNS:   Vital Signs Last 24 Hrs  T(C): 36.8 (2025 11:44), Max: 36.8 (2025 11:44)  T(F): 98.2 (2025 11:44), Max: 98.2 (2025 11:44)  HR: 139 (2025 11:44) (139 - 139)  BP: 148/101 (2025 11:44) (148/101 - 148/101)  BP(mean): --  RR: 18 (2025 11:44) (18 - 18)  SpO2: --    Parameters below as of 2025 11:44  Patient On (Oxygen Delivery Method): room air        I&O's Summary      PHYSICAL EXAM:  Constitutional: NAD, awake and alert  HEENT:  EOMI,  Pupils round, No oral cyanosis.  Pulmonary: Non-labored, breath sounds are clear bilaterally, No wheezing, rales or rhonchi  Cardiovascular: S1 and S2, regular rate and rhythm, no Murmurs, gallops or rubs  Gastrointestinal: Bowel Sounds present, soft, nontender.   Lymph: No peripheral edema. No cervical lymphadenopathy.  Neurological: Alert, no focal deficits  Skin: No rashes.  Psych:  Mood & affect appropriate    LABS: reviewed                         14.0   10.48 )-----------( 178      ( 2025 12:18 )             41.6     2025 12:18    138    |  109    |  9      ----------------------------<  94     4.1     |  21     |  0.61     Ca    9.3        2025 12:18    TPro  7.2    /  Alb  3.0    /  TBili  0.3    /  DBili  x      /  AST  28     /  ALT  29     /  AlkPhos  164    2025 12:18                 REASON FOR VISIT: Tachycardia    HPI:  30 y/o  @ 39w5d (SCAR 2025 via LMP 9/15/2024) sent in for ctx q 10 minutes and blood tinged VB. +FM. Pt was 1.5 cm dilated on last VE. GBS negative. EFW 1860g 4#12 21st%ile on . Pt denies LOF, HA, visual changes, CP, SOB, N/V, fever, chills, RUQ pain.    PNC complicated by:  hx sPEC prior pregnancy  hx PTD    Cardiology consulted for tachycardia. Pt. denies any chest pain, states she has sinus tachycardia HR 90s-100 at baseline, had negative cardiac workup during 1st pregnancy in .     PAST MEDICAL / SURGICAL HISTORY:  Unspecified tachycardia (s/p cardio workup after last pregnancy  unremarkable   HTN      SOCIAL HISTORY:   Alcohol: Denied  Smoking: Nonsmoker  Drug Use: Denied  Marital Status:     FAMILY HISTORY:   - sinus tachycardiac - mother  - HTN - father     Home Medications:  acetaminophen 325 mg oral tablet: 3 tab(s) orally every 6 hours (2023 22:39)  ibuprofen 600 mg oral tablet: 1 tab(s) orally every 6 hours (2023 22:39)  Prenatal Multivitamins with Folic Acid 1 mg oral tablet: 1 tab(s) orally once a day (31 Mar 2023 09:10)      MEDICATIONS  (STANDING):  chlorhexidine 2% Cloths 1 Application(s) Topical daily  citric acid/sodium citrate Solution 15 milliLiter(s) Oral every 6 hours  lactated ringers. 1000 milliLiter(s) (125 mL/Hr) IV Continuous <Continuous>  oxytocin Infusion 167 milliUNIT(s)/Min (167 mL/Hr) IV Continuous <Continuous>  oxytocin Infusion. 2 milliUNIT(s)/Min (2 mL/Hr) IV Continuous <Continuous>    Allergies - No Known Allergies    REVIEW OF SYSTEMS: All other review of systems is negative unless indicated above    VITAL SIGNS:   Vital Signs Last 24 Hrs  T(C): 36.8 (2025 11:44), Max: 36.8 (2025 11:44)  T(F): 98.2 (2025 11:44), Max: 98.2 (2025 11:44)  HR: 139 (2025 11:44) (139 - 139)  BP: 148/101 (2025 11:44) (148/101 - 148/101)  BP(mean): --  RR: 18 (2025 11:44) (18 - 18)  SpO2: --    Parameters below as of 2025 11:44  Patient On (Oxygen Delivery Method): room air        I&O's Summary      PHYSICAL EXAM:  Constitutional: NAD, awake and alert  HEENT:  EOMI,  Pupils round, No oral cyanosis.  Pulmonary: Non-labored, breath sounds are clear bilaterally, No wheezing, rales or rhonchi  Cardiovascular: S1 and S2, regular rate and rhythm, no Murmurs, gallops or rubs  Gastrointestinal: Bowel Sounds present, soft, nontender.   Lymph: No peripheral edema. No cervical lymphadenopathy.  Neurological: Alert, no focal deficits  Skin: No rashes.  Psych:  Mood & affect appropriate    LABS: reviewed                         14.0   10.48 )-----------( 178      ( 2025 12:18 )             41.6     2025 12:18    138    |  109    |  9      ----------------------------<  94     4.1     |  21     |  0.61     Ca    9.3        2025 12:18    TPro  7.2    /  Alb  3.0    /  TBili  0.3    /  DBili  x      /  AST  28     /  ALT  29     /  AlkPhos  164    2025 12:18

## 2025-06-20 NOTE — OB RN PATIENT PROFILE - ALERT: PERTINENT HISTORY
1st Trimester Sonogram/Follow up Sonogram for Growth/Non Invasive Prenatal Screen (NIPS)/Fetal Non-Stress Test (NST)/Ultra Screen at 12 Weeks

## 2025-06-20 NOTE — OB PROVIDER H&P - HISTORY OF PRESENT ILLNESS
30 y/o  @ 39w5d (SCAR 2025 via LMP 9/15/2024) sent in for ctx q 10 minutes and blood tinged VB. +FM. Pt was 1.5 cm dilated on last VE. GBS negative. EFW 1860g 4#12 21st%ile on . Pt denies LOF, HA, visual changes, CP, SOB, N/V, fever, chills, RUQ pain.    PNC complicated by:  hx sPEC prior pregnancy  hx PTD    ObHx: PT (36w)  6#13  c/b sPEC and continued hypertension x 12 weeks pp   GynHx: Denies hx of fibroids, ovarian cysts, abnml PAP smears, STDs  MedHx: Unspecified tachycardia (s/p cardio workup after last pregnancy  unremarkable Denies hx of DM, asthma, thyroid problems, blood clots/bleeding problems, hx of blood transfusions  Meds: PNV, alternating 81/162  All: NKDA  PSHx: Denies  FHx: Denies hx of blood clots/bleeding problems  Social: Denies alcohol/tobacco/drug use in pregnancy  Psych: Denies hx of anxiety/depression

## 2025-06-20 NOTE — OB PROVIDER H&P - NSHPPHYSICALEXAM_GEN_ALL_CORE
Gen: NAD  Abd: soft non tender gravid  Extrem: no calf tenderness  SVE: 4/70/-3  EFM: 155 moderate variability + accels no decels  [place]: q 5  Sono: vertex presentation

## 2025-06-20 NOTE — OB PROVIDER H&P - ATTENDING COMMENTS
P1 39w5d here for IOL. Elevated BP, h/o PEC, PIH labs sent. GBS negative. NST reactive. Interested in getting epidural. In early labor, to get augmented on pitocin. H/o tachycardia up to 150s at time, EKG sent, cardio consult placed, patient is asymptomatic.

## 2025-06-20 NOTE — OB PROVIDER H&P - ASSESSMENT
A/P: 28 y/o  @ 39w5d (SCAR 2025 via LMP 9/15/2024) admitted for early labor and coexisting HTN. BP on arrival 140's/100's, asymptomatic with hx of sPEC prior pregnancy.   - Admit to L&D  - Routine labs, PIH labs  - BP monitoring  - EKG for tachycardia   - IVH  - Continuous EFM/toco  - Obtain consent  - Pit for IOL  - Anesthesia consult  - Anticipate

## 2025-06-20 NOTE — CONSULT NOTE ADULT - PROBLEM SELECTOR RECOMMENDATION 9
. pt. with sinus tachycardia , non sustained, EKG shows ST 130s  . pt. reports ST -100 at baseline,  had negative cardiac workup during 1st pregnancy in 2023  . at present on pitocin gtt and RL  . maintain optimal IV hydration, will repeat EKG and re-eval post partum . pt. with sinus tachycardia , non sustained, EKG shows ST 130s  . pt. reports ST HR  at baseline,  had negative cardiac workup during 1st pregnancy in 2023  . at present on pitocin gtt and RL  . maintain optimal IV hydration, will repeat EKG and re-eval post partum

## 2025-06-20 NOTE — OB RN PATIENT PROFILE - HEIGHT IN FEET
CC:  Mary Tesfaye is here today for Physical,Lt Knee,Leg Pain.    Medications: medications verified and updated  Refills needed today?  NO  denies Latex allergy or sensitivity  Patient would like communication of their results via:      Cell Phone:   Telephone Information:   Mobile [phone]     Okay to leave a message containing results? Yes   Reviewed overdue health maintenance topics with patient.    Health Maintenance Due   Topic Date Due   • Breast Cancer Screening  Never done   • Colorectal Cancer Screen-  Never done   • Shingles Vaccine (2 of 2) 10/07/2020   • COVID-19 Vaccine (2 - Booster for Deisy series) 06/07/2021   • DM/CKD Microalbumin  07/10/2021       Patient is due for the topics as listed above and wishes to proceed with them. Orders placed for Colorectal Cancer Screening: Colonoscopy, DM/CKD Microalbumin and Mammogram.               5

## 2025-06-20 NOTE — OB RN DELIVERY SUMMARY - NS_SEPSISRSKCALC_OBGYN_ALL_OB_FT
GBS status in the 'Prenatal Lab tests/results section' on the OB RN Patient Profile must be documented.   EOS calculated successfully. EOS Risk Factor: 0.5/1000 live births ([facility] national incidence); GA=39w5d; Temp=98.4; ROM=0.367; GBS='Negative'; Antibiotics='No antibiotics or any antibiotics < 2 hrs prior to birth'

## 2025-06-21 ENCOUNTER — TRANSCRIPTION ENCOUNTER (OUTPATIENT)
Age: 30
End: 2025-06-21

## 2025-06-21 LAB
ALBUMIN SERPL ELPH-MCNC: 2.6 G/DL — LOW (ref 3.3–5)
ALP SERPL-CCNC: 158 U/L — HIGH (ref 40–120)
ALT FLD-CCNC: 35 U/L — SIGNIFICANT CHANGE UP (ref 12–78)
ANION GAP SERPL CALC-SCNC: 7 MMOL/L — SIGNIFICANT CHANGE UP (ref 5–17)
AST SERPL-CCNC: 33 U/L — SIGNIFICANT CHANGE UP (ref 15–37)
BILIRUB SERPL-MCNC: 0.4 MG/DL — SIGNIFICANT CHANGE UP (ref 0.2–1.2)
BUN SERPL-MCNC: 7 MG/DL — SIGNIFICANT CHANGE UP (ref 7–23)
CALCIUM SERPL-MCNC: 9.1 MG/DL — SIGNIFICANT CHANGE UP (ref 8.5–10.1)
CHLORIDE SERPL-SCNC: 108 MMOL/L — SIGNIFICANT CHANGE UP (ref 96–108)
CO2 SERPL-SCNC: 23 MMOL/L — SIGNIFICANT CHANGE UP (ref 22–31)
CREAT SERPL-MCNC: 0.59 MG/DL — SIGNIFICANT CHANGE UP (ref 0.5–1.3)
EGFR: 123 ML/MIN/1.73M2 — SIGNIFICANT CHANGE UP
EGFR: 123 ML/MIN/1.73M2 — SIGNIFICANT CHANGE UP
GLUCOSE SERPL-MCNC: 132 MG/DL — HIGH (ref 70–99)
HCT VFR BLD CALC: 38.4 % — SIGNIFICANT CHANGE UP (ref 34.5–45)
HGB BLD-MCNC: 12.7 G/DL — SIGNIFICANT CHANGE UP (ref 11.5–15.5)
MCHC RBC-ENTMCNC: 30 PG — SIGNIFICANT CHANGE UP (ref 27–34)
MCHC RBC-ENTMCNC: 33.1 G/DL — SIGNIFICANT CHANGE UP (ref 32–36)
MCV RBC AUTO: 90.8 FL — SIGNIFICANT CHANGE UP (ref 80–100)
NRBC # BLD AUTO: 0 K/UL — SIGNIFICANT CHANGE UP (ref 0–0)
NRBC # FLD: 0 K/UL — SIGNIFICANT CHANGE UP (ref 0–0)
NRBC BLD AUTO-RTO: 0 /100 WBCS — SIGNIFICANT CHANGE UP (ref 0–0)
PLATELET # BLD AUTO: 163 K/UL — SIGNIFICANT CHANGE UP (ref 150–400)
PMV BLD: 10.8 FL — SIGNIFICANT CHANGE UP (ref 7–13)
POTASSIUM SERPL-MCNC: 3.6 MMOL/L — SIGNIFICANT CHANGE UP (ref 3.5–5.3)
POTASSIUM SERPL-SCNC: 3.6 MMOL/L — SIGNIFICANT CHANGE UP (ref 3.5–5.3)
PROT SERPL-MCNC: 6.5 GM/DL — SIGNIFICANT CHANGE UP (ref 6–8.3)
RBC # BLD: 4.23 M/UL — SIGNIFICANT CHANGE UP (ref 3.8–5.2)
RBC # FLD: 12.5 % — SIGNIFICANT CHANGE UP (ref 10.3–14.5)
SODIUM SERPL-SCNC: 138 MMOL/L — SIGNIFICANT CHANGE UP (ref 135–145)
WBC # BLD: 12.62 K/UL — HIGH (ref 3.8–10.5)
WBC # FLD AUTO: 12.62 K/UL — HIGH (ref 3.8–10.5)

## 2025-06-21 RX ORDER — ASPIRIN 325 MG
1 TABLET ORAL
Refills: 0 | DISCHARGE

## 2025-06-21 RX ORDER — IBUPROFEN 200 MG
1 TABLET ORAL
Qty: 0 | Refills: 0 | DISCHARGE
Start: 2025-06-21

## 2025-06-21 RX ORDER — ACETAMINOPHEN 500 MG/5ML
3 LIQUID (ML) ORAL
Qty: 0 | Refills: 0 | DISCHARGE
Start: 2025-06-21

## 2025-06-21 RX ADMIN — Medication 975 MILLIGRAM(S): at 12:49

## 2025-06-21 RX ADMIN — Medication 3 MILLILITER(S): at 05:07

## 2025-06-21 RX ADMIN — Medication 600 MILLIGRAM(S): at 15:29

## 2025-06-21 RX ADMIN — Medication 975 MILLIGRAM(S): at 01:00

## 2025-06-21 RX ADMIN — Medication 600 MILLIGRAM(S): at 02:48

## 2025-06-21 RX ADMIN — Medication 600 MILLIGRAM(S): at 12:08

## 2025-06-21 RX ADMIN — Medication 600 MILLIGRAM(S): at 21:22

## 2025-06-21 RX ADMIN — Medication 600 MILLIGRAM(S): at 09:46

## 2025-06-21 RX ADMIN — Medication 975 MILLIGRAM(S): at 18:05

## 2025-06-21 RX ADMIN — Medication 975 MILLIGRAM(S): at 06:46

## 2025-06-21 RX ADMIN — Medication 975 MILLIGRAM(S): at 12:20

## 2025-06-21 RX ADMIN — Medication 975 MILLIGRAM(S): at 06:14

## 2025-06-21 RX ADMIN — Medication 600 MILLIGRAM(S): at 21:52

## 2025-06-21 RX ADMIN — Medication 975 MILLIGRAM(S): at 00:02

## 2025-06-21 RX ADMIN — Medication 1 TABLET(S): at 09:45

## 2025-06-21 RX ADMIN — Medication 600 MILLIGRAM(S): at 04:00

## 2025-06-21 NOTE — DISCHARGE NOTE OB - FINANCIAL ASSISTANCE
[facility] provides services at a reduced cost to those who are determined to be eligible through [facility]’s financial assistance program. Information regarding [facility]’s financial assistance program can be found by going to [email or web address] or by calling [phone].

## 2025-06-21 NOTE — DISCHARGE NOTE OB - CARE PLAN
1 Principal Discharge DX:	 (normal spontaneous vaginal delivery)  Assessment and plan of treatment:	Patient underwent a normal spontaneous vaginal delivery. Post-partum course was uncomplicated. Pain is well controlled with PRN medication. She has no difficulty with ambulation, voiding, or PO intake. Lab values and vital signs are within normal limits prior to discharge.  Secondary Diagnosis:	Gestational HTN  Secondary Diagnosis:	Sinus tachycardia   Principal Discharge DX:	 (normal spontaneous vaginal delivery)  Assessment and plan of treatment:	Patient underwent a normal spontaneous vaginal delivery. Post-partum course was uncomplicated. Pain is well controlled with PRN medication. She has no difficulty with ambulation, voiding, or PO intake. Lab values and vital signs are within normal limits prior to discharge.  Secondary Diagnosis:	Gestational HTN  Assessment and plan of treatment:	You have  Secondary Diagnosis:	Sinus tachycardia   Principal Discharge DX:	 (normal spontaneous vaginal delivery)  Assessment and plan of treatment:	Patient underwent a normal spontaneous vaginal delivery. Post-partum course was uncomplicated. Pain is well controlled with PRN medication. She has no difficulty with ambulation, voiding, or PO intake. Lab values and vital signs are within normal limits prior to discharge.  Secondary Diagnosis:	Gestational HTN  Assessment and plan of treatment:	You have a history of gestational hypertension in a previous pregnancy. You were started on Aspirin during your pregnancy. You blood pressures remained at normal levels during your admission. Please STOP taking aspirin and follow up with your OB and Cardio OB.  Secondary Diagnosis:	Sinus tachycardia  Assessment and plan of treatment:	You had elevated heart rates during your admission. You were evaluated by cardiology. Your EKG showing sinus tachycardia, which was likely reactive in the setting of your labor. Your heart rates improved post-partum with hydration. Please follow up with Cardio OB.

## 2025-06-21 NOTE — PROGRESS NOTE ADULT - SUBJECTIVE AND OBJECTIVE BOX
REASON FOR VISIT: Tachycardia    HPI:  30 y/o  @ 39w5d (SCAR 2025 via LMP 9/15/2024) sent in for ctx q 10 minutes and blood tinged VB. +FM. Pt was 1.5 cm dilated on last VE. GBS negative. EFW 1860g 4#12 21st%ile on . Pt denies LOF, HA, visual changes, CP, SOB, N/V, fever, chills, RUQ pain.    PNC complicated by:  hx sPEC prior pregnancy  hx PTD    Cardiology consulted for tachycardia. Pt. denies any chest pain, states she has sinus tachycardia HR 90s-100 at baseline, had negative cardiac workup during 1st pregnancy in .     25: seen post partum, no cardiac complaints, Sinus tachy improved HR 90s -107    Home Medications:  acetaminophen 325 mg oral tablet: 3 tab(s) orally every 6 hours (2023 22:39)  ibuprofen 600 mg oral tablet: 1 tab(s) orally every 6 hours (2023 22:39)  Prenatal Multivitamins with Folic Acid 1 mg oral tablet: 1 tab(s) orally once a day (31 Mar 2023 09:10)    MEDICATIONS  (STANDING):  acetaminophen     Tablet .. 975 milliGRAM(s) Oral <User Schedule>  diphtheria/tetanus/pertussis (acellular) Vaccine (Adacel) 0.5 milliLiter(s) IntraMuscular once  ibuprofen  Tablet. 600 milliGRAM(s) Oral every 6 hours  misoprostol 1000 MICROGram(s) Rectal once  oxytocin Infusion 167 milliUNIT(s)/Min (167 mL/Hr) IV Continuous <Continuous>  oxytocin Infusion 167 milliUNIT(s)/Min (167 mL/Hr) IV Continuous <Continuous>  oxytocin Infusion. 2 milliUNIT(s)/Min (2 mL/Hr) IV Continuous <Continuous>  prenatal multivitamin 1 Tablet(s) Oral daily  sodium chloride 0.9% lock flush 3 milliLiter(s) IV Push every 8 hours      Vital Signs Last 24 Hrs  T(C): 36.7 (2025 08:30), Max: 36.9 (2025 19:17)  T(F): 98.1 (2025 08:30), Max: 98.4 (2025 19:17)  HR: 90 (2025 08:30) (90 - 154)  BP: 119/81 (2025 08:30) (118/79 - 148/101)  BP(mean): --  RR: 16 (2025 08:30) (16 - 18)  SpO2: 100% (2025 08:30) (97% - 100%)    Parameters below as of 2025 08:30  Patient On (Oxygen Delivery Method): room air        PHYSICAL EXAM:  Constitutional: NAD, awake and alert  HEENT:  EOMI,  Pupils round, No oral cyanosis.  Pulmonary: Non-labored, breath sounds are clear bilaterally, No wheezing, rales or rhonchi  Cardiovascular: S1 and S2, regular rate and rhythm, no Murmurs, gallops or rubs  Gastrointestinal: Bowel Sounds present, soft, nontender.   Lymph: No peripheral edema. No cervical lymphadenopathy.  Neurological: Alert, no focal deficits  Skin: No rashes.  Psych:  Mood & affect appropriate    LABS: reviewed                                    12.7   12.62 )-----------( 163      ( 2025 09:23 )             38.4     06-20    138  |  109[H]  |  9   ----------------------------<  94  4.1   |  21[L]  |  0.61    Ca    9.3      2025 12:18    TPro  7.2  /  Alb  3.0[L]  /  TBili  0.3  /  DBili  x   /  AST  28  /  ALT  29  /  AlkPhos  164[H]  06-20    < from: 12 Lead ECG (25 @ 20:48) >  Diagnosis Line Sinus tachycardiawith occasional Premature ventricular complexes  Otherwise normal ECG  When compared with ECG of 2025 14:51,  Premature ventricular complexes are now Present    < end of copied text >        < from: 12 Lead ECG (25 @ 14:51) >  Ventricular Rate 127 BPM    Atrial Rate 127 BPM    P-R Interval 138 ms    QRS Duration 74 ms    Q-T Interval 300 ms    QTC Calculation(Bazett) 436 ms    P Axis 58 degrees    R Axis 68 degrees    T Axis 41 degrees    Diagnosis Line Sinus tachycardia  When compared with ECG of 2023 09:42,  No significant change was found    < end of copied text >             REASON FOR VISIT: Tachycardia    HPI:  30 y/o  @ 39w5d (SCAR 2025 via LMP 9/15/2024) sent in for ctx q 10 minutes and blood tinged VB. +FM. Pt was 1.5 cm dilated on last VE. GBS negative. EFW 1860g 4#12 21st%ile on . Pt denies LOF, HA, visual changes, CP, SOB, N/V, fever, chills, RUQ pain.    PNC complicated by:  hx sPEC prior pregnancy  hx PTD    Cardiology consulted for tachycardia. Pt. denies any chest pain, states she has sinus tachycardia HR 90s-100 at baseline, had negative cardiac workup during 1st pregnancy in .     25: seen post partum, no cardiac complaints, Sinus tachy improved HR 90s -107    Home Medications:  acetaminophen 325 mg oral tablet: 3 tab(s) orally every 6 hours (2023 22:39)  ibuprofen 600 mg oral tablet: 1 tab(s) orally every 6 hours (2023 22:39)  Prenatal Multivitamins with Folic Acid 1 mg oral tablet: 1 tab(s) orally once a day (31 Mar 2023 09:10)    MEDICATIONS  (STANDING):  acetaminophen     Tablet .. 975 milliGRAM(s) Oral <User Schedule>  diphtheria/tetanus/pertussis (acellular) Vaccine (Adacel) 0.5 milliLiter(s) IntraMuscular once  ibuprofen  Tablet. 600 milliGRAM(s) Oral every 6 hours  misoprostol 1000 MICROGram(s) Rectal once  oxytocin Infusion 167 milliUNIT(s)/Min (167 mL/Hr) IV Continuous <Continuous>  oxytocin Infusion 167 milliUNIT(s)/Min (167 mL/Hr) IV Continuous <Continuous>  oxytocin Infusion. 2 milliUNIT(s)/Min (2 mL/Hr) IV Continuous <Continuous>  prenatal multivitamin 1 Tablet(s) Oral daily  sodium chloride 0.9% lock flush 3 milliLiter(s) IV Push every 8 hours    Vital Signs Last 24 Hrs  T(C): 36.7 (2025 08:30), Max: 36.9 (2025 19:17)  T(F): 98.1 (2025 08:30), Max: 98.4 (2025 19:17)  HR: 90 (2025 08:30) (90 - 154)  BP: 119/81 (2025 08:30) (118/79 - 148/101)  BP(mean): --  RR: 16 (2025 08:30) (16 - 18)  SpO2: 100% (2025 08:30) (97% - 100%)    Parameters below as of 2025 08:30  Patient On (Oxygen Delivery Method): room air        PHYSICAL EXAM:  Constitutional: NAD, awake and alert  HEENT:  EOMI,  Pupils round, No oral cyanosis.  Pulmonary: Non-labored, breath sounds are clear bilaterally, No wheezing, rales or rhonchi  Cardiovascular: S1 and S2, regular rate and rhythm, no Murmurs, gallops or rubs  Gastrointestinal: Bowel Sounds present, soft, nontender.   Lymph: No peripheral edema. No cervical lymphadenopathy.  Neurological: Alert, no focal deficits  Skin: No rashes.  Psych:  Mood & affect appropriate    LABS: reviewed                                    12.7   12.62 )-----------( 163      ( 2025 09:23 )             38.4     06-20    138  |  109[H]  |  9   ----------------------------<  94  4.1   |  21[L]  |  0.61    Ca    9.3      2025 12:18    TPro  7.2  /  Alb  3.0[L]  /  TBili  0.3  /  DBili  x   /  AST  28  /  ALT  29  /  AlkPhos  164[H]  06-20    12 Lead ECG (25 @ 20:48) >  Sinus tachycardia with occasional Premature ventricular complexes  Otherwise normal ECG    12 Lead ECG (25 @ 14:51) >  Sinus tachycardia  When compared with ECG of 2023 09:42, No significant change was found

## 2025-06-21 NOTE — DISCHARGE NOTE OB - MEDICATION SUMMARY - MEDICATIONS TO STOP TAKING
I will STOP taking the medications listed below when I get home from the hospital:    ibuprofen 600 mg oral tablet  -- 1 tab(s) by mouth every 6 hours    acetaminophen 325 mg oral tablet  -- 3 tab(s) by mouth every 6 hours    ibuprofen 600 mg oral tablet  -- 1 tab(s) by mouth every 6 hours    labetalol 200 mg oral tablet  -- 1 tab(s) by mouth 3 times a day    acetaminophen 325 mg oral tablet  -- 3 tab(s) by mouth every 6 hours    Procardia XL 90 mg oral tablet, extended release  -- 1 tab(s) by mouth once a day    aspirin 81 mg oral tablet  -- 1 tab(s) by mouth once a day Alternating 81mg and 162mg    Prenatal Multivitamins with Folic Acid 1 mg oral tablet  -- 1 tab(s) by mouth once a day

## 2025-06-21 NOTE — DISCHARGE NOTE OB - MEDICATION SUMMARY - MEDICATIONS TO TAKE
I will START or STAY ON the medications listed below when I get home from the hospital:    ibuprofen 600 mg oral tablet  -- 1 tab(s) by mouth every 6 hours  -- Indication: For Pain    acetaminophen 325 mg oral tablet  -- 3 tab(s) by mouth every 6 hours  -- Indication: For Pain   I will START or STAY ON the medications listed below when I get home from the hospital:    ibuprofen 600 mg oral tablet  -- 1 tab(s) by mouth every 6 hours  -- Indication: For Pain    acetaminophen 325 mg oral tablet  -- 3 tab(s) by mouth every 6 hours  -- Indication: For Pain    NIFEdipine 30 mg oral tablet, extended release  -- 1 tab(s) by mouth once a day  -- Indication: For hypertension

## 2025-06-21 NOTE — PROGRESS NOTE ADULT - PROBLEM SELECTOR PLAN 1
·  Problem: Sinus tachycardia.   ·  Recommendation: . pt. with sinus tachycardia , non sustained, EKG shows ST 130s  . pt. reports ST HR  at baseline,  had negative cardiac workup during 1st pregnancy in 2023  . at present on pitocin gtt and RL  . maintain optimal IV hydration,  . seen post partum, repeat EKG - Sinus tachy improved HR, no cardiac complaints       op follow up with Dr. Mayo,. will sign off ·  Problem: Sinus tachycardia.   ·  Recommendation: . pt. with sinus tachycardia , non sustained, EKG shows ST 130s  . pt. reports ST HR  at baseline,  had negative cardiac workup during 1st pregnancy in 2023  . at present on pitocin gtt and RL  . maintain optimal IV hydration,  . seen post partum, repeat EKG - Sinus tachy improved HR, no cardiac complaints

## 2025-06-21 NOTE — DISCHARGE NOTE OB - HOSPITAL COURSE
30 yo F  [place]  @ 39w5d; rCYTO, gHTN (p/c wnl). Postpartum pain is well controlled with PRN medication. She has no difficulty with ambulation, voiding or PO intake. Lab values and vital signs are within normal limits prior to discharge.   28 yo F  underwent  @ 39w5d; rCYTO, gHTN (p/c wnl). Cardiology consulted for tachycardia. Tachycardia  sinus, non-sustained, EKG shows ST 130s. Improved with pitocin gtt and LR. Post-partum, tachycardia improved. Pt remained asymptomatic. Postpartum pain is well controlled with PRN medication. She has no difficulty with ambulation, voiding or PO intake. Lab values and vital signs are within normal limits prior to discharge.

## 2025-06-21 NOTE — DISCHARGE NOTE OB - CARE PROVIDER_API CALL
Ayesha Bernstein  Obstetrics & Gynecology  [address] [place] [address]  Phone: [phone]  Fax: [phone]  Established Patient  Follow Up Time: 1 month

## 2025-06-21 NOTE — DISCHARGE NOTE OB - PATIENT PORTAL LINK FT
You can access the FollowMyHealth Patient Portal offered by [facility] by registering at the following website: [email or web address]. By joining [employer]s FollowMyHealth portal, you will also be able to view your health information using other applications (apps) compatible with our system.

## 2025-06-21 NOTE — PROGRESS NOTE ADULT - SUBJECTIVE AND OBJECTIVE BOX
CYRUS FRITZ is a 29y G ***  s/p  *** @ *** for ***  PPD1    SUBJECTIVE:  No acute events overnight.  Patient has no complaints.  Pain is well controlled.  +flatus, + voiding.  Ambulating and tolerating PO.  Appropriate lochia.  Denies fever, chills, nausea, and vomiting.  She denies lightheadedness, dizziness, HA, blurry vision, palpitations, chest pain and SOB.     OBJECTIVE:  Physical exam:  General: AOx3, NAD.  Heart: RRR  Lungs: CTAB  Abdomen: Soft, appropriately tender to palpitation, fundus firm.  Vaginal: expectant lochia  Ext: No DVT signs, warm extremities.    Vital Signs Last 24 Hrs  T(C): 36.7 (2025 01:03), Max: 36.9 (2025 19:17)  T(F): 98.1 (2025 01:03), Max: 98.4 (2025 19:17)  HR: 102 (2025 01:03) (98 - 154)  BP: 118/79 (2025 01:03) (118/79 - 148/101)  BP(mean): --  RR: 16 (2025 01:03) (16 - 18)  SpO2: 97% (2025 01:03) (97% - 99%)    Parameters below as of 2025 01:03  Patient On (Oxygen Delivery Method): room air        LABS:                        14.0   10.48 )-----------( 178      ( 2025 12:18 )             41.6     06-20    138  |  109[H]  |  9   ----------------------------<  94  4.1   |  21[L]  |  0.61    Ca    9.3      2025 12:18    TPro  7.2  /  Alb  3.0[L]  /  TBili  0.3  /  DBili  x   /  AST  28  /  ALT  29  /  AlkPhos  164[H]  06-20      A/P:    #Routine post partum care  - Stable, doing well postpartum  - Hgb ***   - Pain: well controlled on PO pain meds  - GI: regular diet, normal bowel function *  - : voiding , lochia decreasing ***  - DVT ppx: SCDs, ambulation encouraged  - Healthy baby *** , circ pending **  - Dispo: ** Regarding prescription called in for  victoza CYRUS FRITZ is a 29y s/p  with cHTN  PPD1    SUBJECTIVE:  No acute events overnight.  Patient has no complaints.  Pain is well controlled.  +flatus, + voiding.  Ambulating and tolerating PO.  Appropriate lochia.  Denies fever, chills, nausea, and vomiting.  She denies lightheadedness, dizziness, HA, blurry vision, palpitations, chest pain and SOB.     OBJECTIVE:  Physical exam:  General: AOx3, NAD.  Heart: RRR  Lungs: CTAB  Abdomen: Soft, appropriately tender to palpitation, fundus firm.  Vaginal: expectant lochia  Ext: No DVT signs, warm extremities.    Vital Signs Last 24 Hrs  T(C): 36.7 (2025 01:03), Max: 36.9 (2025 19:17)  T(F): 98.1 (2025 01:03), Max: 98.4 (2025 19:17)  HR: 102 (2025 01:03) (98 - 154)  BP: 118/79 (2025 01:03) (118/79 - 148/101)  BP(mean): --  RR: 16 (2025 01:03) (16 - 18)  SpO2: 97% (2025 01:03) (97% - 99%)    Parameters below as of 2025 01:03  Patient On (Oxygen Delivery Method): room air        LABS:                        14.0   10.48 )-----------( 178      ( 2025 12:18 )             41.6     06-20    138  |  109[H]  |  9   ----------------------------<  94  4.1   |  21[L]  |  0.61    Ca    9.3      2025 12:18    TPro  7.2  /  Alb  3.0[L]  /  TBili  0.3  /  DBili  x   /  AST  28  /  ALT  29  /  AlkPhos  164[H]  06-20      A/P:    #Routine post partum care  - Stable, doing well postpartum  - Hgb pending  - Pain: well controlled on PO pain meds  - GI: regular diet, normal bowel function  - : voiding , lochia decreasing  - DVT ppx: SCDs, ambulation encouraged  - Dispo: routine pp care,   -Will follow up with cardio ob.

## 2025-06-21 NOTE — DISCHARGE NOTE OB - PLAN OF CARE
Patient underwent a normal spontaneous vaginal delivery. Post-partum course was uncomplicated. Pain is well controlled with PRN medication. She has no difficulty with ambulation, voiding, or PO intake. Lab values and vital signs are within normal limits prior to discharge. You have You have a history of gestational hypertension in a previous pregnancy. You were started on Aspirin during your pregnancy. You blood pressures remained at normal levels during your admission. Please STOP taking aspirin and follow up with your OB and Cardio OB. You had elevated heart rates during your admission. You were evaluated by cardiology. Your EKG showing sinus tachycardia, which was likely reactive in the setting of your labor. Your heart rates improved post-partum with hydration. Please follow up with Cardio OB.

## 2025-06-21 NOTE — PROGRESS NOTE ADULT - NS ATTEND AMEND GEN_ALL_CORE FT
Case discussed with NP; ECGs reviewed -- mild tachycardia; no new cardiac recommendations -- post-partum supportive care.

## 2025-06-21 NOTE — PROGRESS NOTE ADULT - ATTENDING COMMENTS
MD 1 Note    Pt seen and examined.  Agree with note above.  Pt doing well and is stable.  Routine PP care.      PLEE

## 2025-06-22 VITALS
TEMPERATURE: 98 F | SYSTOLIC BLOOD PRESSURE: 126 MMHG | RESPIRATION RATE: 18 BRPM | OXYGEN SATURATION: 100 % | DIASTOLIC BLOOD PRESSURE: 87 MMHG | HEART RATE: 114 BPM

## 2025-06-22 LAB
ALBUMIN SERPL ELPH-MCNC: 2.4 G/DL — LOW (ref 3.3–5)
ALP SERPL-CCNC: 140 U/L — HIGH (ref 40–120)
ALT FLD-CCNC: 30 U/L — SIGNIFICANT CHANGE UP (ref 12–78)
ANION GAP SERPL CALC-SCNC: 6 MMOL/L — SIGNIFICANT CHANGE UP (ref 5–17)
AST SERPL-CCNC: 39 U/L — HIGH (ref 15–37)
BILIRUB SERPL-MCNC: 0.2 MG/DL — SIGNIFICANT CHANGE UP (ref 0.2–1.2)
BUN SERPL-MCNC: 9 MG/DL — SIGNIFICANT CHANGE UP (ref 7–23)
CALCIUM SERPL-MCNC: 8.8 MG/DL — SIGNIFICANT CHANGE UP (ref 8.5–10.1)
CHLORIDE SERPL-SCNC: 113 MMOL/L — HIGH (ref 96–108)
CO2 SERPL-SCNC: 23 MMOL/L — SIGNIFICANT CHANGE UP (ref 22–31)
CREAT SERPL-MCNC: 0.57 MG/DL — SIGNIFICANT CHANGE UP (ref 0.5–1.3)
EGFR: 125 ML/MIN/1.73M2 — SIGNIFICANT CHANGE UP
EGFR: 125 ML/MIN/1.73M2 — SIGNIFICANT CHANGE UP
GLUCOSE SERPL-MCNC: 94 MG/DL — SIGNIFICANT CHANGE UP (ref 70–99)
HCT VFR BLD CALC: 35.1 % — SIGNIFICANT CHANGE UP (ref 34.5–45)
HGB BLD-MCNC: 11.6 G/DL — SIGNIFICANT CHANGE UP (ref 11.5–15.5)
MCHC RBC-ENTMCNC: 29.9 PG — SIGNIFICANT CHANGE UP (ref 27–34)
MCHC RBC-ENTMCNC: 33 G/DL — SIGNIFICANT CHANGE UP (ref 32–36)
MCV RBC AUTO: 90.5 FL — SIGNIFICANT CHANGE UP (ref 80–100)
NRBC # BLD AUTO: 0 K/UL — SIGNIFICANT CHANGE UP (ref 0–0)
NRBC # FLD: 0 K/UL — SIGNIFICANT CHANGE UP (ref 0–0)
NRBC BLD AUTO-RTO: 0 /100 WBCS — SIGNIFICANT CHANGE UP (ref 0–0)
PLATELET # BLD AUTO: 142 K/UL — LOW (ref 150–400)
PMV BLD: 10.5 FL — SIGNIFICANT CHANGE UP (ref 7–13)
POTASSIUM SERPL-MCNC: 3.9 MMOL/L — SIGNIFICANT CHANGE UP (ref 3.5–5.3)
POTASSIUM SERPL-SCNC: 3.9 MMOL/L — SIGNIFICANT CHANGE UP (ref 3.5–5.3)
PROT SERPL-MCNC: 6.2 GM/DL — SIGNIFICANT CHANGE UP (ref 6–8.3)
RBC # BLD: 3.88 M/UL — SIGNIFICANT CHANGE UP (ref 3.8–5.2)
RBC # FLD: 12.4 % — SIGNIFICANT CHANGE UP (ref 10.3–14.5)
SODIUM SERPL-SCNC: 142 MMOL/L — SIGNIFICANT CHANGE UP (ref 135–145)
WBC # BLD: 10.71 K/UL — HIGH (ref 3.8–10.5)
WBC # FLD AUTO: 10.71 K/UL — HIGH (ref 3.8–10.5)

## 2025-06-22 RX ORDER — ACETAMINOPHEN 500 MG/5ML
975 LIQUID (ML) ORAL
Refills: 0 | Status: DISCONTINUED | OUTPATIENT
Start: 2025-06-22 | End: 2025-06-22

## 2025-06-22 RX ORDER — NIFEDIPINE 30 MG
1 TABLET, EXTENDED RELEASE 24 HR ORAL
Qty: 45 | Refills: 0
Start: 2025-06-22

## 2025-06-22 RX ORDER — NIFEDIPINE 30 MG
1 TABLET, EXTENDED RELEASE 24 HR ORAL
Qty: 30 | Refills: 0
Start: 2025-06-22 | End: 2025-07-21

## 2025-06-22 RX ORDER — NIFEDIPINE 30 MG
30 TABLET, EXTENDED RELEASE 24 HR ORAL DAILY
Refills: 0 | Status: DISCONTINUED | OUTPATIENT
Start: 2025-06-22 | End: 2025-06-22

## 2025-06-22 RX ORDER — NIFEDIPINE 30 MG
1 TABLET, EXTENDED RELEASE 24 HR ORAL
Qty: 45 | Refills: 0
Start: 2025-06-22 | End: 2025-08-05

## 2025-06-22 RX ADMIN — Medication 975 MILLIGRAM(S): at 00:53

## 2025-06-22 RX ADMIN — Medication 975 MILLIGRAM(S): at 00:23

## 2025-06-22 RX ADMIN — Medication 30 MILLIGRAM(S): at 09:04

## 2025-06-22 RX ADMIN — Medication 600 MILLIGRAM(S): at 04:37

## 2025-06-22 RX ADMIN — Medication 975 MILLIGRAM(S): at 07:07

## 2025-06-22 RX ADMIN — DIBUCAINE 1 APPLICATION(S): 10 OINTMENT TOPICAL at 09:09

## 2025-06-22 RX ADMIN — Medication 975 MILLIGRAM(S): at 06:37

## 2025-06-22 RX ADMIN — Medication 1 TABLET(S): at 09:04

## 2025-06-22 RX ADMIN — Medication 600 MILLIGRAM(S): at 04:07

## 2025-06-22 RX ADMIN — Medication 975 MILLIGRAM(S): at 11:56

## 2025-06-22 NOTE — PROGRESS NOTE ADULT - ATTENDING COMMENTS
MD1 Note    PPD 2  Pt is doing well. Denies headache, BV, N, V  Mildly elevated BPs overnight  Fundus nontender    History of preeclampsia requiring antihypertensives for 12 weeks postpartum last pregnancy  Will start pt on procardia  Will check AM labs  Will monitor throughout the day and consider dc ike eif stable.  Disc importance of BP control and monitoring and to prevent readmission.  Pt expressed understanding  When to call md disc.  DC instructions reviewed and follow up disc.  Adv follow up with cardio ob.    PLEE MD1 Note    PPD 2  Pt is doing well. Denies headache, BV, N, V  Mildly elevated BPs overnight  Fundus nontender    History of preeclampsia requiring antihypertensives for 12 weeks postpartum last pregnancy  Will start pt on procardia  Will dc ibuprofen for now.  Will check AM labs  Will monitor throughout the day and consider dc ike eif stable.  Disc importance of BP control and monitoring and to prevent readmission.  Pt expressed understanding  When to call md disc.  DC instructions reviewed and follow up disc.  Adv follow up with cardio ob.    PLEE

## 2025-06-23 ENCOUNTER — APPOINTMENT (OUTPATIENT)
Dept: OBGYN | Facility: CLINIC | Age: 30
End: 2025-06-23

## 2025-06-23 PROBLEM — Z34.91 INITIAL OBSTETRIC VISIT IN FIRST TRIMESTER: Status: RESOLVED | Noted: 2022-09-21 | Resolved: 2025-06-23

## 2025-06-23 PROBLEM — Z34.92 SECOND TRIMESTER PREGNANCY: Status: RESOLVED | Noted: 2025-01-29 | Resolved: 2025-06-23

## 2025-06-23 PROBLEM — Z3A.24 24 WEEKS GESTATION OF PREGNANCY: Status: RESOLVED | Noted: 2025-03-03 | Resolved: 2025-06-23

## 2025-06-23 PROBLEM — Z34.92 NORMAL PREGNANCY IN SECOND TRIMESTER: Status: RESOLVED | Noted: 2023-01-09 | Resolved: 2025-06-23

## 2025-06-23 PROBLEM — Z34.93 THIRD TRIMESTER PREGNANCY: Status: RESOLVED | Noted: 2025-05-21 | Resolved: 2025-06-23

## 2025-06-23 PROBLEM — R03.0 ELEVATED BLOOD PRESSURE READING WITHOUT DIAGNOSIS OF HYPERTENSION: Status: RESOLVED | Noted: 2025-03-03 | Resolved: 2025-06-23

## 2025-06-23 NOTE — OB RN PATIENT PROFILE - NS_PRENATALSTART_OBGYN_ALL_OB
Creatinine clearance = 39.  BMP from 6/12/2025 revealed BUN = 39 and creatinine 1.30.  To continue care with nephrology.  To avoid nephrotoxic substances.       
Started first trimester

## 2025-06-24 ENCOUNTER — TRANSCRIPTION ENCOUNTER (OUTPATIENT)
Age: 30
End: 2025-06-24

## 2025-06-24 ENCOUNTER — NON-APPOINTMENT (OUTPATIENT)
Age: 30
End: 2025-06-24

## 2025-06-27 DIAGNOSIS — Z79.82 LONG TERM (CURRENT) USE OF ASPIRIN: ICD-10-CM

## 2025-06-27 DIAGNOSIS — N85.8 OTHER SPECIFIED NONINFLAMMATORY DISORDERS OF UTERUS: ICD-10-CM

## 2025-06-27 DIAGNOSIS — Z28.09 IMMUNIZATION NOT CARRIED OUT BECAUSE OF OTHER CONTRAINDICATION: ICD-10-CM

## 2025-06-27 DIAGNOSIS — R00.0 TACHYCARDIA, UNSPECIFIED: ICD-10-CM

## 2025-06-27 DIAGNOSIS — Z3A.39 39 WEEKS GESTATION OF PREGNANCY: ICD-10-CM

## 2025-07-07 NOTE — OB RN PATIENT PROFILE - NS PRO PT RIGHT SUPPORT PERSON
Quality 47: Advance Care Plan: Advance Care Planning discussed and documented in the medical record; patient did not wish or was not able to name a surrogate decision maker or provide an advance care plan. Quality 226: Preventive Care And Screening: Tobacco Use: Screening And Cessation Intervention: Patient screened for tobacco use and is an ex/non-smoker Detail Level: Detailed Quality 130: Documentation Of Current Medications In The Medical Record: Current Medications Documented Quality 431: Preventive Care And Screening: Unhealthy Alcohol Use - Screening: Patient not identified as an unhealthy alcohol user when screened for unhealthy alcohol use using a systematic screening method Declines

## 2025-07-15 ENCOUNTER — APPOINTMENT (OUTPATIENT)
Dept: CARDIOLOGY | Facility: CLINIC | Age: 30
End: 2025-07-15
Payer: COMMERCIAL

## 2025-07-15 VITALS
DIASTOLIC BLOOD PRESSURE: 82 MMHG | WEIGHT: 140 LBS | BODY MASS INDEX: 24.8 KG/M2 | HEIGHT: 63 IN | SYSTOLIC BLOOD PRESSURE: 124 MMHG

## 2025-07-15 VITALS — OXYGEN SATURATION: 99 % | SYSTOLIC BLOOD PRESSURE: 128 MMHG | HEART RATE: 123 BPM | DIASTOLIC BLOOD PRESSURE: 88 MMHG

## 2025-07-15 PROBLEM — O13.9 GESTATIONAL [PREGNANCY-INDUCED] HYPERTENSION WITHOUT SIGNIFICANT PROTEINURIA, UNSPECIFIED TRIMESTER: Status: ACTIVE | Noted: 2023-04-26

## 2025-07-15 PROCEDURE — 93000 ELECTROCARDIOGRAM COMPLETE: CPT

## 2025-07-15 PROCEDURE — 99213 OFFICE O/P EST LOW 20 MIN: CPT | Mod: 25

## 2025-07-15 RX ORDER — NIFEDIPINE 30 MG
30 TABLET, EXTENDED RELEASE 24 HR ORAL DAILY
Refills: 0 | Status: ACTIVE | COMMUNITY

## 2025-07-24 ENCOUNTER — NON-APPOINTMENT (OUTPATIENT)
Age: 30
End: 2025-07-24

## 2025-07-30 ENCOUNTER — APPOINTMENT (OUTPATIENT)
Dept: OBGYN | Facility: CLINIC | Age: 30
End: 2025-07-30
Payer: COMMERCIAL

## 2025-07-30 VITALS
DIASTOLIC BLOOD PRESSURE: 80 MMHG | WEIGHT: 139 LBS | HEIGHT: 63 IN | BODY MASS INDEX: 24.63 KG/M2 | SYSTOLIC BLOOD PRESSURE: 124 MMHG

## 2025-07-30 PROCEDURE — 0503F POSTPARTUM CARE VISIT: CPT
